# Patient Record
Sex: FEMALE | Race: OTHER | HISPANIC OR LATINO | URBAN - METROPOLITAN AREA
[De-identification: names, ages, dates, MRNs, and addresses within clinical notes are randomized per-mention and may not be internally consistent; named-entity substitution may affect disease eponyms.]

---

## 2017-04-25 ENCOUNTER — EMERGENCY (EMERGENCY)
Facility: HOSPITAL | Age: 32
LOS: 1 days | Discharge: HOME | End: 2017-04-25
Attending: EMERGENCY MEDICINE | Admitting: EMERGENCY MEDICINE
Payer: COMMERCIAL

## 2017-04-25 VITALS
HEART RATE: 89 BPM | TEMPERATURE: 98 F | SYSTOLIC BLOOD PRESSURE: 140 MMHG | OXYGEN SATURATION: 100 % | DIASTOLIC BLOOD PRESSURE: 86 MMHG | RESPIRATION RATE: 16 BRPM

## 2017-04-25 DIAGNOSIS — Z98.89 OTHER SPECIFIED POSTPROCEDURAL STATES: Chronic | ICD-10-CM

## 2017-04-25 DIAGNOSIS — E07.89 OTHER SPECIFIED DISORDERS OF THYROID: Chronic | ICD-10-CM

## 2017-04-25 LAB
ALBUMIN SERPL ELPH-MCNC: 4.5 G/DL — SIGNIFICANT CHANGE UP (ref 3.3–5)
ALP SERPL-CCNC: 73 U/L — SIGNIFICANT CHANGE UP (ref 40–120)
ALT FLD-CCNC: 36 U/L — HIGH (ref 4–33)
APPEARANCE UR: CLEAR — SIGNIFICANT CHANGE UP
AST SERPL-CCNC: 24 U/L — SIGNIFICANT CHANGE UP (ref 4–32)
BASOPHILS # BLD AUTO: 0.03 K/UL — SIGNIFICANT CHANGE UP (ref 0–0.2)
BASOPHILS NFR BLD AUTO: 0.4 % — SIGNIFICANT CHANGE UP (ref 0–2)
BILIRUB SERPL-MCNC: 0.6 MG/DL — SIGNIFICANT CHANGE UP (ref 0.2–1.2)
BILIRUB UR-MCNC: NEGATIVE — SIGNIFICANT CHANGE UP
BLOOD UR QL VISUAL: NEGATIVE — SIGNIFICANT CHANGE UP
BUN SERPL-MCNC: 13 MG/DL — SIGNIFICANT CHANGE UP (ref 7–23)
CALCIUM SERPL-MCNC: 9.3 MG/DL — SIGNIFICANT CHANGE UP (ref 8.4–10.5)
CHLORIDE SERPL-SCNC: 103 MMOL/L — SIGNIFICANT CHANGE UP (ref 98–107)
CO2 SERPL-SCNC: 26 MMOL/L — SIGNIFICANT CHANGE UP (ref 22–31)
COLOR SPEC: SIGNIFICANT CHANGE UP
CREAT SERPL-MCNC: 0.96 MG/DL — SIGNIFICANT CHANGE UP (ref 0.5–1.3)
EOSINOPHIL # BLD AUTO: 0.21 K/UL — SIGNIFICANT CHANGE UP (ref 0–0.5)
EOSINOPHIL NFR BLD AUTO: 2.6 % — SIGNIFICANT CHANGE UP (ref 0–6)
GLUCOSE SERPL-MCNC: 81 MG/DL — SIGNIFICANT CHANGE UP (ref 70–99)
GLUCOSE UR-MCNC: NEGATIVE — SIGNIFICANT CHANGE UP
HCT VFR BLD CALC: 42.3 % — SIGNIFICANT CHANGE UP (ref 34.5–45)
HGB BLD-MCNC: 13.6 G/DL — SIGNIFICANT CHANGE UP (ref 11.5–15.5)
IMM GRANULOCYTES NFR BLD AUTO: 0.2 % — SIGNIFICANT CHANGE UP (ref 0–1.5)
KETONES UR-MCNC: NEGATIVE — SIGNIFICANT CHANGE UP
LEUKOCYTE ESTERASE UR-ACNC: NEGATIVE — SIGNIFICANT CHANGE UP
LYMPHOCYTES # BLD AUTO: 3.34 K/UL — HIGH (ref 1–3.3)
LYMPHOCYTES # BLD AUTO: 41.2 % — SIGNIFICANT CHANGE UP (ref 13–44)
MCHC RBC-ENTMCNC: 31.1 PG — SIGNIFICANT CHANGE UP (ref 27–34)
MCHC RBC-ENTMCNC: 32.2 % — SIGNIFICANT CHANGE UP (ref 32–36)
MCV RBC AUTO: 96.8 FL — SIGNIFICANT CHANGE UP (ref 80–100)
MONOCYTES # BLD AUTO: 0.72 K/UL — SIGNIFICANT CHANGE UP (ref 0–0.9)
MONOCYTES NFR BLD AUTO: 8.9 % — SIGNIFICANT CHANGE UP (ref 2–14)
MUCOUS THREADS # UR AUTO: SIGNIFICANT CHANGE UP
NEUTROPHILS # BLD AUTO: 3.78 K/UL — SIGNIFICANT CHANGE UP (ref 1.8–7.4)
NEUTROPHILS NFR BLD AUTO: 46.7 % — SIGNIFICANT CHANGE UP (ref 43–77)
NITRITE UR-MCNC: NEGATIVE — SIGNIFICANT CHANGE UP
PH UR: 6 — SIGNIFICANT CHANGE UP (ref 4.6–8)
PLATELET # BLD AUTO: 230 K/UL — SIGNIFICANT CHANGE UP (ref 150–400)
PMV BLD: 11.7 FL — SIGNIFICANT CHANGE UP (ref 7–13)
POTASSIUM SERPL-MCNC: 3.7 MMOL/L — SIGNIFICANT CHANGE UP (ref 3.5–5.3)
POTASSIUM SERPL-SCNC: 3.7 MMOL/L — SIGNIFICANT CHANGE UP (ref 3.5–5.3)
PROT SERPL-MCNC: 7.7 G/DL — SIGNIFICANT CHANGE UP (ref 6–8.3)
PROT UR-MCNC: NEGATIVE — SIGNIFICANT CHANGE UP
RBC # BLD: 4.37 M/UL — SIGNIFICANT CHANGE UP (ref 3.8–5.2)
RBC # FLD: 13.5 % — SIGNIFICANT CHANGE UP (ref 10.3–14.5)
RBC CASTS # UR COMP ASSIST: SIGNIFICANT CHANGE UP (ref 0–?)
SODIUM SERPL-SCNC: 142 MMOL/L — SIGNIFICANT CHANGE UP (ref 135–145)
SP GR SPEC: 1.01 — SIGNIFICANT CHANGE UP (ref 1–1.03)
SQUAMOUS # UR AUTO: SIGNIFICANT CHANGE UP
UROBILINOGEN FLD QL: NORMAL E.U. — SIGNIFICANT CHANGE UP (ref 0.1–0.2)
WBC # BLD: 8.1 K/UL — SIGNIFICANT CHANGE UP (ref 3.8–10.5)
WBC # FLD AUTO: 8.1 K/UL — SIGNIFICANT CHANGE UP (ref 3.8–10.5)
WBC UR QL: SIGNIFICANT CHANGE UP (ref 0–?)

## 2017-04-25 PROCEDURE — 99284 EMERGENCY DEPT VISIT MOD MDM: CPT

## 2017-04-25 NOTE — ED ADULT TRIAGE NOTE - CHIEF COMPLAINT QUOTE
c.o left sided abdominal pain since Sunday. denies nausea/vomiting. appears comfortable. pmh thyroid cancer

## 2017-04-25 NOTE — ED PROVIDER NOTE - PROGRESS NOTE DETAILS
Donnie Jimenez: Pt signed out to me by Dr. Bonilla. Plan to follow ct results. ct results showing no signs of LUQ pain. Denies any pelvic pain. Pt feeling better, tolerating PO. Pt stable for discharge and to follow up with pmd.

## 2017-04-25 NOTE — ED PROVIDER NOTE - OBJECTIVE STATEMENT
32F h/o thyroid cancer s/p thyroidectomy presents with left sided abdominal pain. States pain started 2d ago and has been constant since. Pain is in LUQ and does not radiate. No associated n/v/d, no dysuria or hematuria. No prior h/o similar pain although reports that pain feels similar to when she had trouble with her liver during IVF treatment several years ago, but this pain is on the left.  Notes some increased pain with twisting movements. Now LMP last month, was placed on OCPs but developed some nausea so she stopped several weeks ago.  No fever.  No other new medications.

## 2017-04-26 PROCEDURE — 74177 CT ABD & PELVIS W/CONTRAST: CPT | Mod: 26

## 2019-10-09 PROBLEM — E03.9 HYPOTHYROIDISM, UNSPECIFIED: Chronic | Status: ACTIVE | Noted: 2017-04-25

## 2019-10-25 ENCOUNTER — RESULT REVIEW (OUTPATIENT)
Age: 34
End: 2019-10-25

## 2019-10-25 ENCOUNTER — OUTPATIENT (OUTPATIENT)
Dept: OUTPATIENT SERVICES | Facility: HOSPITAL | Age: 34
LOS: 1 days | Discharge: HOME | End: 2019-10-25

## 2019-10-25 DIAGNOSIS — E07.89 OTHER SPECIFIED DISORDERS OF THYROID: Chronic | ICD-10-CM

## 2019-10-25 DIAGNOSIS — Z98.89 OTHER SPECIFIED POSTPROCEDURAL STATES: Chronic | ICD-10-CM

## 2019-10-26 ENCOUNTER — TRANSCRIPTION ENCOUNTER (OUTPATIENT)
Age: 34
End: 2019-10-26

## 2019-10-31 LAB — SURGICAL PATHOLOGY STUDY: SIGNIFICANT CHANGE UP

## 2019-11-15 PROBLEM — Z00.00 ENCOUNTER FOR PREVENTIVE HEALTH EXAMINATION: Status: ACTIVE | Noted: 2019-11-15

## 2020-01-24 NOTE — HISTORY OF PRESENT ILLNESS
[de-identified] : LIAM ALLEN is a 34 year old female who present in the office with morbid obesity (BMI  __  kg/m2)  for bariatric surgery consultation. Patient was referred by  ______

## 2020-01-28 ENCOUNTER — APPOINTMENT (OUTPATIENT)
Dept: SURGERY | Facility: CLINIC | Age: 35
End: 2020-01-28

## 2020-03-06 ENCOUNTER — APPOINTMENT (OUTPATIENT)
Dept: OBGYN | Facility: CLINIC | Age: 35
End: 2020-03-06

## 2020-03-10 ENCOUNTER — APPOINTMENT (OUTPATIENT)
Dept: SURGERY | Facility: CLINIC | Age: 35
End: 2020-03-10

## 2021-04-07 ENCOUNTER — APPOINTMENT (OUTPATIENT)
Dept: SURGERY | Facility: CLINIC | Age: 36
End: 2021-04-07
Payer: COMMERCIAL

## 2021-04-07 VITALS
BODY MASS INDEX: 35.34 KG/M2 | HEART RATE: 86 BPM | DIASTOLIC BLOOD PRESSURE: 79 MMHG | HEIGHT: 64 IN | SYSTOLIC BLOOD PRESSURE: 118 MMHG | WEIGHT: 207 LBS

## 2021-04-07 VITALS — TEMPERATURE: 97.2 F

## 2021-04-07 DIAGNOSIS — Z82.49 FAMILY HISTORY OF ISCHEMIC HEART DISEASE AND OTHER DISEASES OF THE CIRCULATORY SYSTEM: ICD-10-CM

## 2021-04-07 DIAGNOSIS — E07.9 DISORDER OF THYROID, UNSPECIFIED: ICD-10-CM

## 2021-04-07 DIAGNOSIS — Z83.3 FAMILY HISTORY OF DIABETES MELLITUS: ICD-10-CM

## 2021-04-07 DIAGNOSIS — Z78.9 OTHER SPECIFIED HEALTH STATUS: ICD-10-CM

## 2021-04-07 DIAGNOSIS — Z83.49 FAMILY HISTORY OF OTHER ENDOCRINE, NUTRITIONAL AND METABOLIC DISEASES: ICD-10-CM

## 2021-04-07 PROCEDURE — 99203 OFFICE O/P NEW LOW 30 MIN: CPT

## 2021-04-07 PROCEDURE — 99072 ADDL SUPL MATRL&STAF TM PHE: CPT

## 2021-04-07 RX ORDER — METFORMIN HYDROCHLORIDE 625 MG/1
TABLET ORAL
Refills: 0 | Status: ACTIVE | COMMUNITY

## 2021-04-07 RX ORDER — LEVOTHYROXINE SODIUM 137 UG/1
TABLET ORAL
Refills: 0 | Status: ACTIVE | COMMUNITY

## 2021-04-07 NOTE — PLAN
[FreeTextEntry1] : \par I have had a lengthy conversation with the patient and have discussed my impression and treatment plan with the patient.  \par The risks, benefits and alternatives, including laparoscopic gastric bypass, and laparoscopic vertical sleeve gastrectomy, were discussed at length and all of his questions were answered. The patient appears to understand and wishes to proceed.\par \par The patient was given the following instructions:\par \par 1.	Patient needs a complete medical evaluation including echocardiogram, stress test, pulmonary function test and evaluation for sleep apnea.\par \par 2.	Patient needs evaluation by GI including an upper endoscopy.\par \par 3.	Patient needs nutritional and psychological evaluations.\par \par 4.	Patient must attend a preoperative information meeting.\par \par 5.	Patient must undergo a right upper quadrant ultrasound, if there is no history of prior cholecystectomy.\par \par ·	Screening for Obstructive Sleep apnea (performed today).  Patient does meet criteria for polysomnography testing\par ·	Functional Assessment: Completely independent\par ·	Smoking:  Patient does not smoke.  Smoking cessation counseling/resources discussed (if indicated)\par ·	Substance Abuse:  Patient does not report use of illicit substances.  Counseling/resources discussed (if indicated)\par \par \par The weight loss surgery information packet as well as the nutrition education packet have been reviewed and given to the patient, and I provided and reviewed detailed documents addressing these same topics. I have provided literature about the procedures and encouraged the patient to further research the surgeries, and patient feels well informed.   I also provided patient with detailed information regarding the pre-operative requirements with respect to testing, medical, nutritional and psychological evaluations and documentation of same.  \par \par Also discussed was importance of taking supplements and attending regular follow-up.  I reviewed importance of behavioral modification and follow-up in order to optimize outcomes and avoid complications. The patient is aware of the expected length of stay and discharge plan for a sleeve gastrectomy.  I encouraged the patient to maintain a diet and exercise program to promote optimum health for the surgical procedure and post-op course. \par \par The patient clearly understood that surgery would only be scheduled if there are no medical or psychiatric contraindications and that follow up pre-operative office visits are required.  Patient agrees to begin a multi-disciplinary evaluation as discussed and provide required documentation and progress.  I informed patient that once all testing and evaluations (as deemed necessary) are completed, reviewed, and documentation received, this information to justify medical necessity for weight loss surgery will be submitted to insurance for pre-certification.  \par \par Patient will begin the pre-operative process with a visit to PCP, for whom detailed information regarding the necessary evaluations and documentation of obesity-related medical care was given to patient to share with PCP.  \par \par The patient had the opportunity to ask pertinent questions which were answered.  All of patient’s questions and concerns were addressed to patient’s satisfaction, and patient verbalized an understanding of the information discussed.\par \par 3.	Patient needs nutritional and psychological evaluations.\par 4.	Patient must attend a preoperative information meeting.\par 5.	Patient must undergo a right upper quadrant ultrasound, if there is no history of prior cholecystectomy.\par \par The weight loss surgery information packet as well as the nutrition education packet have been reviewed and given to the patient, and I provided and reviewed detailed documents addressing these same topics. I have provided literature about the procedures and encouraged the patient to further research the surgeries, and patient feels well informed. I also provided patient with detailed information regarding the pre-operative requirements with respect to testing, medical, nutritional and psychological evaluations and documentation of same. \par \par Also discussed was importance of taking supplements and attending regular follow-up. I reviewed importance of behavioral modification and follow-up in order to optimize outcomes and avoid complications. The patient is aware of the expected length of stay and discharge plan for a sleeve gastrectomy. I encouraged the patient to maintain a diet and exercise program to promote optimum health for the surgical procedure and post-op course. \par \par The patient clearly understood that surgery would only be scheduled if there are no medical or psychiatric contraindications and that follow up pre-operative office visits are required. Patient agrees to begin a multi-disciplinary evaluation as discussed and provide required documentation and progress. I informed patient that once all testing and evaluations (as deemed necessary) are completed, reviewed, and documentation received, this information to justify medical necessity for weight loss surgery will be submitted to insurance for pre-certification. \par \par Patient will begin the pre-operative process with a visit to PCP, for whom detailed information regarding the necessary evaluations and documentation of obesity-related medical care was given to patient to share with PCP. \par \par The patient had the opportunity to ask pertinent questions which were answered. All of patient’s questions and concerns were addressed to patient’s satisfaction, and patient verbalized an understanding of the information discussed.\par

## 2021-04-07 NOTE — CONSULT LETTER
[Dear  ___] : Dear  [unfilled], [Consult Letter:] : I had the pleasure of evaluating your patient, [unfilled]. [Please see my note below.] : Please see my note below. [Consult Closing:] : Thank you very much for allowing me to participate in the care of this patient.  If you have any questions, please do not hesitate to contact me. [Sincerely,] : Sincerely, [FreeTextEntry3] : Dylan

## 2021-04-07 NOTE — REVIEW OF SYSTEMS
[Recent Change In Weight] : ~T recent weight change [SOB on Exertion] : shortness of breath during exertion [Reflux/Heartburn] : reflux/heartburn [Negative] : Allergic/Immunologic [Patient Intake Form Reviewed] : Patient intake form was reviewed [Fever] : no fever [Chills] : no chills [Night Sweats] : no night sweats [Fatigue] : no fatigue [Vision Problems] : no vision problems [Dysphagia] : no dysphagia [Hoarseness] : no hoarseness [Odynophagia] : no odynophagia [Chest Pain] : no chest pain [Palpitations] : no palpitations [Lower Ext Edema] : no lower extremity edema [Shortness Of Breath] : no shortness of breath [Wheezing] : no wheezing [Cough] : no cough [Abdominal Pain] : no abdominal pain [Vomiting] : no vomiting [Constipation] : no constipation [Dysuria] : no dysuria [Incontinence] : no incontinence [Joint Pain] : no joint pain [Joint Stiffness] : no joint stiffness [Confused] : no confusion [Dizziness] : no dizziness [Difficulty Walking] : no difficulty walking [Easy Bleeding] : no tendency for easy bleeding [Easy Bruising] : no tendency for easy bruising

## 2021-04-07 NOTE — ASSESSMENT
[FreeTextEntry1] : \par Patient with a BMI of 35.5 which places patient in the morbidly obese category.  Patient also suffers from DM2 .  This has directly contributed to patient's  current medical conditions as well as, a decreased quality of life.  Patient has very little chance of successfully losing and maintaining a significant amount of weight with non-surgical management, and would benefit from surgical intervention.  Patient meets the criteria for weight loss surgery as defined in the NIH consensus statement, surgery is medically necessary. \par Given patient’s current BMI and obesity-related comorbidities, I feel the patient is a candidate for and would benefit from weight loss surgery, as all prior attempts by non-surgical means have been futile.\par \par VTE Risk Calculation:\par \par Does patient have PERSONAL history of VTE? N\par Does patient have PERSONAL history of myocardial infarction, cardiac stent, or acute coronary syndrome? N\par \par Does patient have FAMILY history of VTE? N\par Does patient have FAMILY history of myocardial infarction, cardiac stent, or acute coronary syndrome? N\par \par \par Screening for Sleep Apnea:\par Nightly snoring:  Y\par Witnessed apnea:   Y\par Nocturnal gasping:  Y\par Morning dry mouth:  Y\par Feeling unrefreshed on awakening from sleep:  Y\par Excessive daytime sleepiness:  Y\par \par

## 2021-04-07 NOTE — HISTORY OF PRESENT ILLNESS
[de-identified] : Ms. LIAM ALLEN  presents today  for bariatric surgery consultation. she has a long-standing history of severe obesity refractory to multiple prior attempts at weight loss including conservative treatments of diet and exercise.  Ms. LIAM ALLEN has been obese since  her thyroidectomy  due to the thyroid CA  in 2012 and the most weight that she  has been able to lose by any means is negligible.   Previous weight loss efforts include: Calorie-counting, restricted intake. Ms. ALLEN has limited capacity for exercise due to excess weight.  she  feels that weight loss surgery is the only option at this point for effective and clinically meaningful weight loss.  she reports abdominal myomectomy, laparoscopy with RADHA and laparoscopic  cholecystectomy .  she states that she had an EGD in February 2021 that showed H.pylori. she reports being referred to sleep study due to loud snoring. She did not have the test due to the childcare issues.  she  is interested in sleeve gastrectomy.

## 2021-04-07 NOTE — PHYSICAL EXAM
[Obese, well nourished, in no acute distress] : obese, well nourished, in no acute distress [Normal] : affect appropriate [de-identified] : Obese, protuberant. Soft, nontender, nondistended, no rebound or guarding.

## 2021-05-17 ENCOUNTER — APPOINTMENT (OUTPATIENT)
Dept: CARDIOLOGY | Facility: CLINIC | Age: 36
End: 2021-05-17

## 2021-05-29 ENCOUNTER — APPOINTMENT (OUTPATIENT)
Dept: DISASTER EMERGENCY | Facility: CLINIC | Age: 36
End: 2021-05-29

## 2021-05-30 LAB — SARS-COV-2 N GENE NPH QL NAA+PROBE: NOT DETECTED

## 2021-06-01 ENCOUNTER — APPOINTMENT (OUTPATIENT)
Dept: PULMONOLOGY | Facility: CLINIC | Age: 36
End: 2021-06-01
Payer: COMMERCIAL

## 2021-06-01 DIAGNOSIS — R06.83 SNORING: ICD-10-CM

## 2021-06-01 PROCEDURE — 94729 DIFFUSING CAPACITY: CPT

## 2021-06-01 PROCEDURE — ZZZZZ: CPT

## 2021-06-01 PROCEDURE — 94010 BREATHING CAPACITY TEST: CPT

## 2021-06-01 PROCEDURE — 94726 PLETHYSMOGRAPHY LUNG VOLUMES: CPT

## 2021-06-01 PROCEDURE — 99244 OFF/OP CNSLTJ NEW/EST MOD 40: CPT | Mod: 25

## 2021-06-01 PROCEDURE — 99072 ADDL SUPL MATRL&STAF TM PHE: CPT

## 2021-06-01 PROCEDURE — 71046 X-RAY EXAM CHEST 2 VIEWS: CPT

## 2021-06-01 NOTE — CONSULT LETTER
[Dear  ___] : Dear  [unfilled], [Consult Letter:] : I had the pleasure of evaluating your patient, [unfilled]. [Please see my note below.] : Please see my note below. [Consult Closing:] : Thank you very much for allowing me to participate in the care of this patient.  If you have any questions, please do not hesitate to contact me. [Sincerely,] : Sincerely, [FreeTextEntry3] : Sammie Bowles MD, [facility]

## 2021-06-01 NOTE — PHYSICAL EXAM
[No Acute Distress] : no acute distress [Normal Oropharynx] : normal oropharynx [Normal Appearance] : normal appearance [No Neck Mass] : no neck mass [Normal Rate/Rhythm] : normal rate/rhythm [Normal S1, S2] : normal s1, s2 [No Murmurs] : no murmurs [No Resp Distress] : no resp distress [Clear to Auscultation Bilaterally] : clear to auscultation bilaterally [No Abnormalities] : no abnormalities [Benign] : benign [Normal Gait] : normal gait [No Clubbing] : no clubbing [No Edema] : no edema [No Cyanosis] : no cyanosis [FROM] : FROM [Normal Color/ Pigmentation] : normal color/ pigmentation [No Focal Deficits] : no focal deficits [Oriented x3] : oriented x3 [Normal Affect] : normal affect

## 2021-06-01 NOTE — ASSESSMENT
[FreeTextEntry1] : 36F with MO. Clinically adnd radiographically no evidence of pulmonary disease.\par PFTs normal\par CXR clear\par \par Pt reports significant snoring and daytime sleepiness, and should undergo a diagnostic home sleep study to assess for any evidence of LAURY. If found to be moderate or severe, would recommend 2-3 weeks of appropriate CPAP treatment prior to any surgical intervention.

## 2021-06-01 NOTE — HISTORY OF PRESENT ILLNESS
[TextBox_4] : 36F who is here for preop evaluation for bariatric surgery. Pt reports no h/o respiratory illnesses, non smoker, without any occupational exposures. Pt reports good exercise tolerance on flat surface and some difficulty climbing stairs. No cough, no chest pain or palpitations. \par Pt reports significant ,snoring, frequent night awakenings and daytime sleepiness with ESS >10.

## 2021-06-15 ENCOUNTER — APPOINTMENT (OUTPATIENT)
Dept: CARDIOLOGY | Facility: CLINIC | Age: 36
End: 2021-06-15

## 2021-06-21 ENCOUNTER — APPOINTMENT (OUTPATIENT)
Dept: ENDOCRINOLOGY | Facility: CLINIC | Age: 36
End: 2021-06-21
Payer: COMMERCIAL

## 2021-06-21 VITALS — HEIGHT: 64 IN | WEIGHT: 206 LBS | BODY MASS INDEX: 35.17 KG/M2

## 2021-06-21 PROCEDURE — ZZZZZ: CPT

## 2021-07-12 ENCOUNTER — APPOINTMENT (OUTPATIENT)
Dept: CARDIOLOGY | Facility: CLINIC | Age: 36
End: 2021-07-12
Payer: COMMERCIAL

## 2021-07-12 ENCOUNTER — NON-APPOINTMENT (OUTPATIENT)
Age: 36
End: 2021-07-12

## 2021-07-12 VITALS
BODY MASS INDEX: 35.17 KG/M2 | RESPIRATION RATE: 17 BRPM | WEIGHT: 206 LBS | OXYGEN SATURATION: 98 % | TEMPERATURE: 98.4 F | HEIGHT: 64 IN | HEART RATE: 88 BPM | DIASTOLIC BLOOD PRESSURE: 65 MMHG | SYSTOLIC BLOOD PRESSURE: 115 MMHG

## 2021-07-12 DIAGNOSIS — R07.89 OTHER CHEST PAIN: ICD-10-CM

## 2021-07-12 DIAGNOSIS — E11.9 TYPE 2 DIABETES MELLITUS W/OUT COMPLICATIONS: ICD-10-CM

## 2021-07-12 DIAGNOSIS — R01.1 CARDIAC MURMUR, UNSPECIFIED: ICD-10-CM

## 2021-07-12 DIAGNOSIS — Z13.6 ENCOUNTER FOR SCREENING FOR CARDIOVASCULAR DISORDERS: ICD-10-CM

## 2021-07-12 PROCEDURE — 99205 OFFICE O/P NEW HI 60 MIN: CPT

## 2021-07-12 PROCEDURE — 93000 ELECTROCARDIOGRAM COMPLETE: CPT

## 2021-07-12 PROCEDURE — 99072 ADDL SUPL MATRL&STAF TM PHE: CPT

## 2021-07-14 ENCOUNTER — APPOINTMENT (OUTPATIENT)
Dept: SURGERY | Facility: CLINIC | Age: 36
End: 2021-07-14
Payer: COMMERCIAL

## 2021-07-14 VITALS
HEIGHT: 64 IN | DIASTOLIC BLOOD PRESSURE: 82 MMHG | SYSTOLIC BLOOD PRESSURE: 121 MMHG | WEIGHT: 210 LBS | HEART RATE: 76 BPM | OXYGEN SATURATION: 99 % | BODY MASS INDEX: 35.85 KG/M2

## 2021-07-14 VITALS — TEMPERATURE: 97.5 F

## 2021-07-14 LAB
ALBUMIN SERPL ELPH-MCNC: 4.3 G/DL
ALP BLD-CCNC: 84 U/L
ALT SERPL-CCNC: 52 U/L
ANION GAP SERPL CALC-SCNC: 9 MMOL/L
AST SERPL-CCNC: 24 U/L
BASOPHILS # BLD AUTO: 0.04 K/UL
BASOPHILS NFR BLD AUTO: 0.9 %
BILIRUB SERPL-MCNC: 0.9 MG/DL
BUN SERPL-MCNC: 10 MG/DL
CALCIUM SERPL-MCNC: 9.6 MG/DL
CALCIUM SERPL-MCNC: 9.6 MG/DL
CHLORIDE SERPL-SCNC: 104 MMOL/L
CHOLEST SERPL-MCNC: 141 MG/DL
CO2 SERPL-SCNC: 27 MMOL/L
CREAT SERPL-MCNC: 0.62 MG/DL
EOSINOPHIL # BLD AUTO: 0.18 K/UL
EOSINOPHIL NFR BLD AUTO: 3.8 %
ESTIMATED AVERAGE GLUCOSE: 111 MG/DL
FOLATE SERPL-MCNC: 11.3 NG/ML
GLUCOSE SERPL-MCNC: 94 MG/DL
HBA1C MFR BLD HPLC: 5.5 %
HCT VFR BLD CALC: 41.2 %
HDLC SERPL-MCNC: 38 MG/DL
HGB BLD-MCNC: 13.1 G/DL
IMM GRANULOCYTES NFR BLD AUTO: 0.4 %
IRON SERPL-MCNC: 110 UG/DL
LDLC SERPL CALC-MCNC: 85 MG/DL
LYMPHOCYTES # BLD AUTO: 2.33 K/UL
LYMPHOCYTES NFR BLD AUTO: 49.7 %
MAGNESIUM SERPL-MCNC: 1.6 MG/DL
MAN DIFF?: NORMAL
MCHC RBC-ENTMCNC: 30.6 PG
MCHC RBC-ENTMCNC: 31.8 GM/DL
MCV RBC AUTO: 96.3 FL
MONOCYTES # BLD AUTO: 0.53 K/UL
MONOCYTES NFR BLD AUTO: 11.3 %
NEUTROPHILS # BLD AUTO: 1.59 K/UL
NEUTROPHILS NFR BLD AUTO: 33.9 %
NONHDLC SERPL-MCNC: 102 MG/DL
PARATHYROID HORMONE INTACT: 43 PG/ML
PLATELET # BLD AUTO: 243 K/UL
POTASSIUM SERPL-SCNC: 4.6 MMOL/L
PROT SERPL-MCNC: 6.7 G/DL
RBC # BLD: 4.28 M/UL
RBC # FLD: 13 %
SODIUM SERPL-SCNC: 139 MMOL/L
TRIGL SERPL-MCNC: 84 MG/DL
TSH SERPL-ACNC: 0.58 UIU/ML
VIT B12 SERPL-MCNC: 469 PG/ML
WBC # FLD AUTO: 4.69 K/UL

## 2021-07-14 PROCEDURE — 99072 ADDL SUPL MATRL&STAF TM PHE: CPT

## 2021-07-14 PROCEDURE — 99213 OFFICE O/P EST LOW 20 MIN: CPT

## 2021-07-14 RX ORDER — LEVOTHYROXINE SODIUM 0.15 MG/1
150 TABLET ORAL
Qty: 30 | Refills: 0 | Status: ACTIVE | COMMUNITY
Start: 2020-11-08

## 2021-07-14 RX ORDER — EPINEPHRINE 0.3 MG/.3ML
0.3 INJECTION INTRAMUSCULAR
Qty: 2 | Refills: 0 | Status: ACTIVE | COMMUNITY
Start: 2021-07-02

## 2021-07-14 RX ORDER — OMEPRAZOLE MAGNESIUM, AMOXICILLIN AND RIFABUTIN 10; 250; 12.5 MG/1; MG/1; MG/1
250-12.5-1 CAPSULE, DELAYED RELEASE ORAL
Qty: 168 | Refills: 0 | Status: ACTIVE | COMMUNITY
Start: 2021-03-19

## 2021-07-14 RX ORDER — FLUTICASONE PROPIONATE 50 UG/1
50 SPRAY, METERED NASAL
Qty: 16 | Refills: 0 | Status: ACTIVE | COMMUNITY
Start: 2021-07-02

## 2021-07-14 NOTE — HISTORY OF PRESENT ILLNESS
[de-identified] : Ms. LIAM ALLEN  is here for monthly pre-operative follow-up and weight management program in preparation for bariatric surgery. she  is making good food choices, working on eating smaller portions and becoming more mindful. Ms. ALLEN  has made a concerted effort to eat more balanced and nutritionally sound meals, and to engage in some level of physical activity on a regular basis. she  continues to struggle with weight loss despite continuous concerted efforts since the prior visit.\par \par Patient has completed the following evaluations: Cardiology- needs to schedule Echo and stress test, Pulmonary- PFT was normal, needs to  the equipment for Sleep Study at home , Psych.- cleared  GI cleared.  Patient has also seen our bariatric program coordinator and nutritionist. She has attended our bariatric information session.   \par   [de-identified] : Patient reports no interval changes to medications, medical history or overall health status.\par

## 2021-07-14 NOTE — REVIEW OF SYSTEMS
[Recent Change In Weight] : ~T recent weight change [Negative] : Allergic/Immunologic [Patient Intake Form Reviewed] : Patient intake form was reviewed [SOB on Exertion] : shortness of breath during exertion [Reflux/Heartburn] : reflux/heartburn [Fever] : no fever [Chills] : no chills [Night Sweats] : no night sweats [Fatigue] : no fatigue [Vision Problems] : no vision problems [Dysphagia] : no dysphagia [Hoarseness] : no hoarseness [Odynophagia] : no odynophagia [Chest Pain] : no chest pain [Palpitations] : no palpitations [Lower Ext Edema] : no lower extremity edema [Shortness Of Breath] : no shortness of breath [Wheezing] : no wheezing [Cough] : no cough [Abdominal Pain] : no abdominal pain [Vomiting] : no vomiting [Constipation] : no constipation [Dysuria] : no dysuria [Incontinence] : no incontinence [Joint Pain] : no joint pain [Joint Stiffness] : no joint stiffness [Confused] : no confusion [Dizziness] : no dizziness [Difficulty Walking] : no difficulty walking [Easy Bleeding] : no tendency for easy bleeding [Easy Bruising] : no tendency for easy bruising

## 2021-07-14 NOTE — PLAN
[FreeTextEntry1] :  Patient will obtain any outstanding evaluations in short order. Once again the pre-op requirements/evaluations and any available results were reviewed. \par \par We will collect and review any available/additional results and records of the multi-disciplinary evaluation. I encouraged patient to bring copies of all completed testing/evaluations to the next office visit with me. \par \par I encouraged the patient to continue a diet and exercise program to promote optimum health for the surgical procedure and post-op course.\par \par Patient voiced understanding of these behavioral recommendations and agrees to practice them with the understanding that success with these behaviors will be assessed at future visits. \par \par Patient’s questions and concerns addressed to patient's satisfaction and patient verbalized an understanding of the information discussed.\par  \par She will return to see me in early September.

## 2021-07-14 NOTE — CONSULT LETTER
[Dear  ___] : Dear  [unfilled], [Courtesy Letter:] : I had the pleasure of seeing your patient, [unfilled], in my office today. [Please see my note below.] : Please see my note below. [Consult Closing:] : Thank you very much for allowing me to participate in the care of this patient.  If you have any questions, please do not hesitate to contact me. [Sincerely,] : Sincerely, [FreeTextEntry3] : Dylan

## 2021-07-14 NOTE — PHYSICAL EXAM
[Obese, well nourished, in no acute distress] : obese, well nourished, in no acute distress [Normal] : affect appropriate [de-identified] : Obese, protuberant. Soft, nontender, nondistended, no rebound or guarding.

## 2021-07-14 NOTE — ASSESSMENT
[FreeTextEntry1] : Patient remains an excellent candidate for weight loss surgery, given the presence/risk of developing or worsening of multiple obesity-related comorbidities, and remains committed to proceeding with weight loss surgery.\par \par Patient is in the process of obtaining the required pre-operative evaluations, which for this patient include:\par Nutrition\par  \par  Cardiology\par Pulmonary \par Labs

## 2021-07-15 LAB — 24R-OH-CALCIDIOL SERPL-MCNC: 59.2 PG/ML

## 2021-07-22 ENCOUNTER — OUTPATIENT (OUTPATIENT)
Dept: OUTPATIENT SERVICES | Facility: HOSPITAL | Age: 36
LOS: 1 days | End: 2021-07-22
Payer: COMMERCIAL

## 2021-07-22 DIAGNOSIS — R01.1 CARDIAC MURMUR, UNSPECIFIED: ICD-10-CM

## 2021-07-22 DIAGNOSIS — Z98.89 OTHER SPECIFIED POSTPROCEDURAL STATES: Chronic | ICD-10-CM

## 2021-07-22 DIAGNOSIS — E07.89 OTHER SPECIFIED DISORDERS OF THYROID: Chronic | ICD-10-CM

## 2021-07-22 PROCEDURE — 93017 CV STRESS TEST TRACING ONLY: CPT

## 2021-07-22 PROCEDURE — 93018 CV STRESS TEST I&R ONLY: CPT

## 2021-07-22 PROCEDURE — 93016 CV STRESS TEST SUPVJ ONLY: CPT

## 2021-07-22 PROCEDURE — 93306 TTE W/DOPPLER COMPLETE: CPT | Mod: 26

## 2021-07-22 PROCEDURE — 93306 TTE W/DOPPLER COMPLETE: CPT

## 2021-07-29 ENCOUNTER — FORM ENCOUNTER (OUTPATIENT)
Age: 36
End: 2021-07-29

## 2021-07-30 ENCOUNTER — APPOINTMENT (OUTPATIENT)
Dept: SLEEP CENTER | Facility: CLINIC | Age: 36
End: 2021-07-30

## 2021-08-12 ENCOUNTER — APPOINTMENT (OUTPATIENT)
Dept: PULMONOLOGY | Facility: CLINIC | Age: 36
End: 2021-08-12
Payer: COMMERCIAL

## 2021-08-12 VITALS
BODY MASS INDEX: 35.85 KG/M2 | OXYGEN SATURATION: 98 % | WEIGHT: 210 LBS | SYSTOLIC BLOOD PRESSURE: 134 MMHG | DIASTOLIC BLOOD PRESSURE: 82 MMHG | HEART RATE: 67 BPM | HEIGHT: 64 IN | TEMPERATURE: 98 F | RESPIRATION RATE: 16 BRPM

## 2021-08-12 DIAGNOSIS — G47.33 OBSTRUCTIVE SLEEP APNEA (ADULT) (PEDIATRIC): ICD-10-CM

## 2021-08-12 DIAGNOSIS — Z01.811 ENCOUNTER FOR PREPROCEDURAL RESPIRATORY EXAMINATION: ICD-10-CM

## 2021-08-12 PROCEDURE — 99214 OFFICE O/P EST MOD 30 MIN: CPT

## 2021-08-12 NOTE — ASSESSMENT
[FreeTextEntry1] : 36F with MO. Clinically adnd radiographically no evidence of pulmonary disease.\par PFTs normal\par CXR clear\par \par Pt has moderate LAURY with frequent desaturations as low as 79%. She will need to be treated with nightly CPAP to be optimized for surgery for at least 2-3 weeks.\par Equipment ordered. \par f/u 4-6 weeks after initiation of CPAP use.\par

## 2021-08-12 NOTE — CONSULT LETTER
[Dear  ___] : Dear  [unfilled], [Consult Letter:] : I had the pleasure of evaluating your patient, [unfilled]. [Please see my note below.] : Please see my note below. [Consult Closing:] : Thank you very much for allowing me to participate in the care of this patient.  If you have any questions, please do not hesitate to contact me. [Sincerely,] : Sincerely, [FreeTextEntry3] : Sammie Bowles MD, [facility]

## 2021-09-22 ENCOUNTER — APPOINTMENT (OUTPATIENT)
Dept: SURGERY | Facility: CLINIC | Age: 36
End: 2021-09-22
Payer: COMMERCIAL

## 2021-09-22 ENCOUNTER — APPOINTMENT (OUTPATIENT)
Dept: BARIATRICS | Facility: CLINIC | Age: 36
End: 2021-09-22

## 2021-09-22 VITALS
HEART RATE: 80 BPM | BODY MASS INDEX: 35.85 KG/M2 | DIASTOLIC BLOOD PRESSURE: 81 MMHG | WEIGHT: 210 LBS | OXYGEN SATURATION: 99 % | HEIGHT: 64 IN | SYSTOLIC BLOOD PRESSURE: 122 MMHG

## 2021-09-22 VITALS — TEMPERATURE: 97.2 F

## 2021-09-22 PROCEDURE — 99214 OFFICE O/P EST MOD 30 MIN: CPT

## 2021-09-22 NOTE — ASSESSMENT
[FreeTextEntry1] : Patient remains an excellent candidate for weight loss surgery, given the  presence/risk of developing or worsening of  multiple obesity-related comorbidities, and remains committed to proceeding with weight loss surgery. \par \par Patient is in the process of obtaining  the required pre-operative evaluations. \par \par She will be seeing her PCP on October 19th for medical clearance. \par \par \par \par \par

## 2021-09-22 NOTE — DATA REVIEWED
[FreeTextEntry1] :  Cardiology- needs to schedule Echo and stress test, \par Pulmonary- PFT was normal, needs to receive her CPAP device \par Psych.- cleared  (Dr. Monzon)\par GI cleared. \par  Patient has also seen our bariatric program coordinator and nutritionist. \par She has attended our bariatric information session.

## 2021-09-22 NOTE — PLAN
[FreeTextEntry1] : Patient will obtain any outstanding evaluations in short order. Once again the pre-op requirements/evaluations and any available results were reviewed. \par \par We will collect and review any available/additional results and records of the multi-disciplinary evaluation.  I encouraged patient to bring copies of all completed testing/evaluations to the next office visit with me. \par \par I encouraged the patient to continue a diet and exercise program to promote optimum health for the surgical procedure and post-op course.\par \par Patient  voiced understanding of these behavioral recommendations and agrees to practice them with the understanding that success with these behaviors will be assessed at future visits. \par \par Patient’s questions and concerns addressed to patient's satisfaction and patient verbalized an understanding of the information discussed.\par \par She will return to see me on October 20.

## 2021-09-22 NOTE — PHYSICAL EXAM
[Obese, well nourished, in no acute distress] : obese, well nourished, in no acute distress [Normal] : affect appropriate [de-identified] : Obese, protuberant. Soft, nontender, nondistended, no rebound or guarding.

## 2021-09-22 NOTE — HISTORY OF PRESENT ILLNESS
[de-identified] : Patient is here for monthly pre-operative follow-up and  weight management program in preparation for bariatric surgery.  Patient is making good food choices, working on eating smaller portions and becoming more mindful. \par Patient has made a concerted effort to eat more balanced and nutritionally sound meals, and to engage in some level of physical activity on a regular basis. \par Patient continues to struggle with weight loss despite continuous concerted efforts since the prior visit.  \par Patient remains interested in a sleeve gastrectomy.    \par Patient has completed the following evaluations: GI/EGD .    \par Patient had initial evaluation with pulmonary, but is awaiting her CPAP device.   [de-identified] : Patient reports no interval changes to medications, medical history or overall health status.\par

## 2021-09-22 NOTE — REVIEW OF SYSTEMS
[Patient Intake Form Reviewed] : Patient intake form was reviewed [Recent Change In Weight] : ~T recent weight change [SOB on Exertion] : shortness of breath during exertion [Reflux/Heartburn] : reflux/heartburn [Negative] : Allergic/Immunologic [Fever] : no fever [Chills] : no chills [Night Sweats] : no night sweats [Fatigue] : no fatigue [Vision Problems] : no vision problems [Dysphagia] : no dysphagia [Hoarseness] : no hoarseness [Chest Pain] : no chest pain [Odynophagia] : no odynophagia [Palpitations] : no palpitations [Lower Ext Edema] : no lower extremity edema [Wheezing] : no wheezing [Shortness Of Breath] : no shortness of breath [Cough] : no cough [Abdominal Pain] : no abdominal pain [Vomiting] : no vomiting [Constipation] : no constipation [Dysuria] : no dysuria [Incontinence] : no incontinence [Joint Pain] : no joint pain [Joint Stiffness] : no joint stiffness [Dizziness] : no dizziness [Confused] : no confusion [Difficulty Walking] : no difficulty walking [Easy Bleeding] : no tendency for easy bleeding [Easy Bruising] : no tendency for easy bruising

## 2021-10-20 ENCOUNTER — APPOINTMENT (OUTPATIENT)
Dept: SURGERY | Facility: CLINIC | Age: 36
End: 2021-10-20
Payer: COMMERCIAL

## 2021-10-20 VITALS
OXYGEN SATURATION: 99 % | HEIGHT: 64 IN | BODY MASS INDEX: 35.85 KG/M2 | HEART RATE: 97 BPM | DIASTOLIC BLOOD PRESSURE: 84 MMHG | SYSTOLIC BLOOD PRESSURE: 135 MMHG | TEMPERATURE: 97.1 F | WEIGHT: 210 LBS

## 2021-10-20 PROCEDURE — 99213 OFFICE O/P EST LOW 20 MIN: CPT

## 2021-10-20 NOTE — ASSESSMENT
[FreeTextEntry1] : Patient remains an excellent candidate for weight loss surgery, given the presence/risk of developing or worsening of multiple obesity-related comorbidities, and remains committed to proceeding with weight loss surgery.\par \par Patient is in the process of obtaining the required pre-operative evaluations, which for this patient include:\par  \par Pulmonary - 2 weeks CPAP titration

## 2021-10-20 NOTE — HISTORY OF PRESENT ILLNESS
[de-identified] : Patient is here for monthly pre-operative follow-up in preparation for bariatric surgery. Patient is making good food choices, working on eating smaller portions and becoming more mindful. Patient has made a concerted effort to eat more balanced and nutritionally sound meals, and to engage in some level of physical activity on a regular basis. Patient continues to struggle with weight loss despite continuous concerted efforts since the prior visit. Patient presents for review and discussion of informed consent in preparation for planned sleeve gastrectomy. Patient attended our weight loss seminar, and has completed all of the required pre-operative testing and evaluations, and is ready to proceed with a sleeve gastrectomy.   Patient had initial evaluation with pulmonary, but is awaiting her CPAP device.         [de-identified] : Patient reports no interval changes to medications, medical history or overall health status.\par

## 2021-10-20 NOTE — PHYSICAL EXAM
[Obese, well nourished, in no acute distress] : obese, well nourished, in no acute distress [Normal] : affect appropriate [de-identified] : Obese, protuberant. Soft, nontender, nondistended, no rebound or guarding.

## 2021-10-20 NOTE — PLAN
[FreeTextEntry1] : Patient will obtain any outstanding evaluations in short order. Once again the pre-op requirements/evaluations and any available results were reviewed. \par \par We will collect and review any available/additional results and records of the multi-disciplinary evaluation.  I encouraged patient to bring copies of all completed testing/evaluations to the next office visit with me. \par \par I encouraged the patient to continue a diet and exercise program to promote optimum health for the surgical procedure and post-op course.\par \par Patient  voiced understanding of these behavioral recommendations and agrees to practice them with the understanding that success with these behaviors will be assessed at future visits. \par \par We will review all available pre-operative evaluations and documentation, and submit to insurance provider for pre-authorization.  \par \par We will plan to proceed with surgery, pending any outstanding medical clearances and insurance authorization. \par \par Informed consent for weight loss surgery procedure was given to patient, and will be reviewed at the pre-operative visit.  \par \par Patient’s questions and concerns addressed to patient's satisfaction and patient verbalized an understanding of the information discussed.\par \par  she will return to the office for discussion of the consent after she receives her CPAP machine for surgical scheduling and final pre-operative visit.

## 2021-11-10 ENCOUNTER — APPOINTMENT (OUTPATIENT)
Dept: SURGERY | Facility: CLINIC | Age: 36
End: 2021-11-10
Payer: COMMERCIAL

## 2021-11-10 VITALS
OXYGEN SATURATION: 99 % | SYSTOLIC BLOOD PRESSURE: 141 MMHG | HEART RATE: 84 BPM | HEIGHT: 64 IN | WEIGHT: 211 LBS | TEMPERATURE: 97.6 F | DIASTOLIC BLOOD PRESSURE: 81 MMHG | BODY MASS INDEX: 36.02 KG/M2

## 2021-11-10 PROCEDURE — 99214 OFFICE O/P EST MOD 30 MIN: CPT

## 2021-11-10 NOTE — PHYSICAL EXAM
[Obese, well nourished, in no acute distress] : obese, well nourished, in no acute distress [Normal] : affect appropriate [de-identified] : Obese, protuberant. Soft, nontender, nondistended, no rebound or guarding.

## 2021-11-10 NOTE — ASSESSMENT
[FreeTextEntry1] :  \par Patient is a 36 year  old female with a BMI of 36.22 which places patient in the morbidly obese category. This has directly contributed to patient's current medical conditions as well as, a decreased quality of life. Patient has very little chance of successfully losing and maintaining a significant amount of weight with non-surgical management, and would benefit from surgical intervention. Patient meets the criteria for weight loss surgery as defined in the NIH consensus statement, surgery is medically necessary.  Given patient’s current BMI and obesity-related comorbidities, I feel the patient is a candidate for and would benefit from weight loss surgery, as all prior attempts by non-surgical means have been futile. She  has completed all of the required pre-operative testing and evaluations, and is ready to proceed with a sleeve gastrectomy

## 2021-11-10 NOTE — HISTORY OF PRESENT ILLNESS
[de-identified] : Patient is here for monthly pre-operative follow-up in preparation for bariatric surgery. Patient is making good food choices, working on eating smaller portions and becoming more mindful. Patient has made a concerted effort to eat more balanced and nutritionally sound meals, and to engage in some level of physical activity on a regular basis. Patient continues to struggle with weight loss despite continuous concerted efforts since the prior visit. Patient presents for review and discussion of informed consent in preparation for planned sleeve gastrectomy. Patient attended our weight loss seminar, and has completed all of the required pre-operative testing and evaluations, and is ready to proceed with a sleeve gastrectomy. she has received her CPAP machine and has been consistent in using it. Patient  patient had blood work done in October that showed low TSH level. she was advised by her PMD to see her endocrinologist prior to surgery.  [de-identified] : Patient reports no interval changes to medications, medical history or overall health status.\par

## 2021-11-10 NOTE — PLAN
[FreeTextEntry1] : I have had a lengthy and detailed conversation with the patient and have discussed my impressions and treatment plans with the patient. \par Informed consent and potential risks , benefits  and alternatives to the planned surgery were discussed in depth, including but not limited to:  bleeding, infection; seroma, hematoma, abscess; intestinal, gastric, vascular or other visceral or solid organ injury; leak;  injury to other adjacent or surrounding structures or organs; new or worsening reflux/heartburn; inability to complete the intended procedure or need to abort the procedure; port site or incisional hernia; need for reoperation or other procedure; perioperative myocardial infarction, stroke, deep vein thrombosis, pulmonary embolus, pneumonia and death.  All surgical options were discussed including non-surgical treatments. The patient remains interested in a sleeve gastrectomy for weight loss. \par \par The weight loss surgery education packet as well as the nutrition education packet have been reviewed and given to the patient previously, and I also provided patient with detailed information regarding the post-operative instructions and expectations.  \par Also discussed was importance of taking supplements and attending regular follow-up. I reviewed importance of behavioral modification and follow-up in order to optimize outcomes and avoid complications. \par The patient is aware of the expected length of stay and discharge plan for the sleeve gastrectomy procedure. I encouraged the patient to maintain a diet and exercise program to promote optimum health for the surgical procedure and post-op course. \par I informed patient that all testing and evaluations and documentation have been received and reviewed.  This information to justify medical necessity for weight loss surgery will be submitted to insurance for pre-certification. \par \par We will plan to proceed with surgery at the next available date, pending any required insurance pre-certification or pre-approval.  \par Patient agrees to obtain any outstanding necessary pre-operative evaluations and clearances that may be required for pre-surgical optimization.  \par \par The patient had the opportunity to ask pertinent questions, and all of patient’s questions and concerns were addressed to patient’s satisfaction.  Patient verbalized an understanding of the information discussed, and wishes to proceed with surgery. Informed consent for sleeve gastrectomy reviewed with and signed by patient.\par \par

## 2021-12-03 DIAGNOSIS — Z01.818 ENCOUNTER FOR OTHER PREPROCEDURAL EXAMINATION: ICD-10-CM

## 2021-12-10 ENCOUNTER — APPOINTMENT (OUTPATIENT)
Dept: DISASTER EMERGENCY | Facility: CLINIC | Age: 36
End: 2021-12-10

## 2021-12-30 ENCOUNTER — OUTPATIENT (OUTPATIENT)
Dept: OUTPATIENT SERVICES | Facility: HOSPITAL | Age: 36
LOS: 1 days | End: 2021-12-30
Payer: COMMERCIAL

## 2021-12-30 VITALS
HEART RATE: 74 BPM | TEMPERATURE: 99 F | RESPIRATION RATE: 17 BRPM | DIASTOLIC BLOOD PRESSURE: 73 MMHG | SYSTOLIC BLOOD PRESSURE: 125 MMHG | OXYGEN SATURATION: 100 % | HEIGHT: 64 IN | WEIGHT: 214.95 LBS

## 2021-12-30 DIAGNOSIS — E03.9 HYPOTHYROIDISM, UNSPECIFIED: ICD-10-CM

## 2021-12-30 DIAGNOSIS — Z01.818 ENCOUNTER FOR OTHER PREPROCEDURAL EXAMINATION: ICD-10-CM

## 2021-12-30 DIAGNOSIS — Z98.89 OTHER SPECIFIED POSTPROCEDURAL STATES: Chronic | ICD-10-CM

## 2021-12-30 DIAGNOSIS — E66.01 MORBID (SEVERE) OBESITY DUE TO EXCESS CALORIES: ICD-10-CM

## 2021-12-30 DIAGNOSIS — Z29.9 ENCOUNTER FOR PROPHYLACTIC MEASURES, UNSPECIFIED: ICD-10-CM

## 2021-12-30 DIAGNOSIS — E07.89 OTHER SPECIFIED DISORDERS OF THYROID: Chronic | ICD-10-CM

## 2021-12-30 DIAGNOSIS — G47.33 OBSTRUCTIVE SLEEP APNEA (ADULT) (PEDIATRIC): ICD-10-CM

## 2021-12-30 DIAGNOSIS — Z90.49 ACQUIRED ABSENCE OF OTHER SPECIFIED PARTS OF DIGESTIVE TRACT: Chronic | ICD-10-CM

## 2021-12-30 LAB
BLD GP AB SCN SERPL QL: SIGNIFICANT CHANGE UP
TSH SERPL-MCNC: 3.24 UU/ML — SIGNIFICANT CHANGE UP (ref 0.34–4.82)

## 2021-12-30 PROCEDURE — G0463: CPT

## 2021-12-30 NOTE — H&P PST ADULT - NSICDXPASTSURGICALHX_GEN_ALL_CORE_FT
PAST SURGICAL HISTORY:  H/O myomectomy 2013    H/O total thyroidectomy 2012    History of laparoscopic cholecystectomy 2019    History of tonsillectomy 2003

## 2021-12-30 NOTE — H&P PST ADULT - RS GEN PE MLT RESP DETAILS PC
normal/airway patent/breath sounds equal/good air movement/respirations non-labored/clear to auscultation bilaterally/no intercostal retractions/no wheezes

## 2021-12-30 NOTE — H&P PST ADULT - ATTENDING COMMENTS
I have reviewed the history and physical – recorded 1 to 30 days prior the procedure – and I have re-examined the patient (so as to update any changes in the patient's health status), and I state that it is still appropriate with my corrections and/or amendments as documented.     Patient reports no interval changes to overall health status or medical history since our previous encounter.      I have had a lengthy conversation with the patient and have discussed my impressions and treatment plans with the patient.  Detailed informed consent and potential risks , benefits and alternatives to the planned surgery were once again reinforced and reviewed, including but not limited to:  bleeding, infection, esophageal/gastric/intestinal or other visceral or solid organ injury; leak, sepsis, death, reflux, inability to perform the intended procedure, inability to lose desired amount of weight, regain of weight; port site or incisional hernia, need for other procedure or re-operation, perioperative myocardial infarction, stroke, deep vein thrombosis, pulmonary embolus, pneumonia and death.  All surgical options were discussed including non-surgical treatments. The patient remains interested in a robotic-assisted laparoscopic sleeve gastrectomy for weight loss.     The weight loss surgery education packet as well as the nutrition education packet have been reviewed and given to the patient previously, and I also provided patient with detailed information regarding the post-operative instructions and expectations.  The patient is aware of the expected immediate post operative course, length of stay and discharge plan for the sleeve gastrectomy procedure.   The patient had the opportunity to ask pertinent questions, and all of patient’s questions and concerns were addressed to patient’s satisfaction.  Patient verbalized an understanding of the information discussed, and wishes to proceed with surgery. Informed consent signed.

## 2021-12-30 NOTE — H&P PST ADULT - PRO INTERPRETER NEED 2
General Sunscreen Counseling: Chronic condition. I recommended 30 SPF or higher in daily facial moisturizer. We discussed use of SPF 30 or higher sunscreen, and reapplying this every 3 hours when in the sun. We discussed the use of sun protective clothing and broad-brimmed hats. Detail Level: Zone Products Recommended: Daily facial moisturizer - Cetaphil oil control moisturizer with SPF 30 English

## 2021-12-30 NOTE — H&P PST ADULT - NSICDXPASTMEDICALHX_GEN_ALL_CORE_FT
PAST MEDICAL HISTORY:  Anemia     Fatty liver     Hypothyroid     Hypothyroidism, unspecified type     Morbid (severe) obesity due to excess calories     LAURY on CPAP     Thyroid cancer 2012

## 2021-12-30 NOTE — H&P PST ADULT - FALL HARM RISK - UNIVERSAL INTERVENTIONS
Bed in lowest position, wheels locked, appropriate side rails in place/Call bell, personal items and telephone in reach/Instruct patient to call for assistance before getting out of bed or chair/Non-slip footwear when patient is out of bed/[place] to call system/Physically safe environment - no spills, clutter or unnecessary equipment/Purposeful Proactive Rounding/Room/bathroom lighting operational, light cord in reach

## 2021-12-30 NOTE — H&P PST ADULT - NECK DETAILS
Wound Evaluated By: Tony
Add 81805 Cpt? (Important Note: In 2017 The Use Of 31482 Is Being Tracked By Cms To Determine Future Global Period Reimbursement For Global Periods): no
Detail Level: Detailed
Body Location Override (Optional - Billing Will Still Be Based On Selected Body Map Location If Applicable): Left medial eyebrow
scar thyroidectomy/normal/supple

## 2021-12-30 NOTE — H&P PST ADULT - HISTORY OF PRESENT ILLNESS
36 yr old female Morbidly obese with history of thyroid cancer (h/o thyroidectomy no other adjunct therapy), fatty liver, LAURY moderate on CPAP machine pt had under gone the requirements for bariatric surgery. Pt is schedule for robotic assisted laparoscopic sleeve gastrectomy 1/10/2022. Pt instructed to bring her CPAP machine the day of surgery. Pt instructed on the chlorhexidene on the day of surgery.

## 2021-12-30 NOTE — H&P PST ADULT - PROBLEM SELECTOR PLAN 2
Stop Bang Score confirmed moderate LAURY pt uses CPAP machine. Pt informed to bring in machine day of surgery. Pt will be monitor for patent airway during hospital stay.

## 2022-01-08 LAB — SARS-COV-2 N GENE NPH QL NAA+PROBE: NOT DETECTED

## 2022-01-09 ENCOUNTER — TRANSCRIPTION ENCOUNTER (OUTPATIENT)
Age: 37
End: 2022-01-09

## 2022-01-10 ENCOUNTER — TRANSCRIPTION ENCOUNTER (OUTPATIENT)
Age: 37
End: 2022-01-10

## 2022-01-10 ENCOUNTER — RESULT REVIEW (OUTPATIENT)
Age: 37
End: 2022-01-10

## 2022-01-10 ENCOUNTER — APPOINTMENT (OUTPATIENT)
Dept: SURGERY | Facility: HOSPITAL | Age: 37
End: 2022-01-10

## 2022-01-10 ENCOUNTER — INPATIENT (INPATIENT)
Facility: HOSPITAL | Age: 37
LOS: 0 days | Discharge: HOME | DRG: 621 | End: 2022-01-11
Attending: SURGERY | Admitting: SURGERY
Payer: COMMERCIAL

## 2022-01-10 VITALS
RESPIRATION RATE: 16 BRPM | HEART RATE: 83 BPM | TEMPERATURE: 98 F | OXYGEN SATURATION: 99 % | SYSTOLIC BLOOD PRESSURE: 132 MMHG | DIASTOLIC BLOOD PRESSURE: 73 MMHG | WEIGHT: 214.95 LBS | HEIGHT: 64 IN

## 2022-01-10 DIAGNOSIS — E66.01 MORBID (SEVERE) OBESITY DUE TO EXCESS CALORIES: ICD-10-CM

## 2022-01-10 DIAGNOSIS — E07.89 OTHER SPECIFIED DISORDERS OF THYROID: Chronic | ICD-10-CM

## 2022-01-10 DIAGNOSIS — Z98.89 OTHER SPECIFIED POSTPROCEDURAL STATES: Chronic | ICD-10-CM

## 2022-01-10 DIAGNOSIS — Z90.49 ACQUIRED ABSENCE OF OTHER SPECIFIED PARTS OF DIGESTIVE TRACT: Chronic | ICD-10-CM

## 2022-01-10 LAB
BLD GP AB SCN SERPL QL: SIGNIFICANT CHANGE UP
HCG UR QL: NEGATIVE — SIGNIFICANT CHANGE UP

## 2022-01-10 PROCEDURE — 88312 SPECIAL STAINS GROUP 1: CPT | Mod: 26

## 2022-01-10 PROCEDURE — 88307 TISSUE EXAM BY PATHOLOGIST: CPT | Mod: 26

## 2022-01-10 PROCEDURE — S2900 ROBOTIC SURGICAL SYSTEM: CPT | Mod: NC

## 2022-01-10 PROCEDURE — 43775 LAP SLEEVE GASTRECTOMY: CPT

## 2022-01-10 DEVICE — XI STAPLER SUREFORM RELOAD 60 BLACK: Type: IMPLANTABLE DEVICE | Status: FUNCTIONAL

## 2022-01-10 DEVICE — SEALANT VISTASEAL FIBRIN HUMAN 4ML: Type: IMPLANTABLE DEVICE | Status: FUNCTIONAL

## 2022-01-10 DEVICE — SEALANT TISSEEL PRE FILLED FROZEN 4ML: Type: IMPLANTABLE DEVICE | Status: FUNCTIONAL

## 2022-01-10 DEVICE — STAPLER COVIDIEN TRI-STAPLE 45MM PURPLE INTELLIGENT RELOAD: Type: IMPLANTABLE DEVICE | Status: FUNCTIONAL

## 2022-01-10 DEVICE — XI STAPLER SUREFORM RELOAD 60 WHITE: Type: IMPLANTABLE DEVICE | Status: FUNCTIONAL

## 2022-01-10 DEVICE — XI STAPLER SUREFORM RELOAD 60 BLUE: Type: IMPLANTABLE DEVICE | Status: FUNCTIONAL

## 2022-01-10 DEVICE — CLIP APPLIER COVIDIEN ENDOCLIP III 5MM: Type: IMPLANTABLE DEVICE | Status: FUNCTIONAL

## 2022-01-10 DEVICE — STAPLER COVIDIEN TRI-STAPLE 60MM BLACK INTELLIGENT RELOAD: Type: IMPLANTABLE DEVICE | Status: FUNCTIONAL

## 2022-01-10 DEVICE — XI STAPLER SUREFORM RELOAD 60 GREEN: Type: IMPLANTABLE DEVICE | Status: FUNCTIONAL

## 2022-01-10 DEVICE — LIGATING CLIPS SYNOVIS SUPERFINE MICROCLIP 6: Type: IMPLANTABLE DEVICE | Status: FUNCTIONAL

## 2022-01-10 RX ORDER — METOCLOPRAMIDE HCL 10 MG
10 TABLET ORAL EVERY 6 HOURS
Refills: 0 | Status: DISCONTINUED | OUTPATIENT
Start: 2022-01-10 | End: 2022-01-11

## 2022-01-10 RX ORDER — FENTANYL CITRATE 50 UG/ML
25 INJECTION INTRAVENOUS
Refills: 0 | Status: DISCONTINUED | OUTPATIENT
Start: 2022-01-10 | End: 2022-01-10

## 2022-01-10 RX ORDER — ENOXAPARIN SODIUM 100 MG/ML
40 INJECTION SUBCUTANEOUS ONCE
Refills: 0 | Status: COMPLETED | OUTPATIENT
Start: 2022-01-10 | End: 2022-01-10

## 2022-01-10 RX ORDER — ONDANSETRON 8 MG/1
4 TABLET, FILM COATED ORAL EVERY 6 HOURS
Refills: 0 | Status: DISCONTINUED | OUTPATIENT
Start: 2022-01-10 | End: 2022-01-11

## 2022-01-10 RX ORDER — SODIUM CHLORIDE 9 MG/ML
1000 INJECTION, SOLUTION INTRAVENOUS
Refills: 0 | Status: DISCONTINUED | OUTPATIENT
Start: 2022-01-10 | End: 2022-01-11

## 2022-01-10 RX ORDER — PANTOPRAZOLE SODIUM 20 MG/1
40 TABLET, DELAYED RELEASE ORAL EVERY 24 HOURS
Refills: 0 | Status: DISCONTINUED | OUTPATIENT
Start: 2022-01-10 | End: 2022-01-11

## 2022-01-10 RX ORDER — SODIUM CHLORIDE 9 MG/ML
3 INJECTION INTRAMUSCULAR; INTRAVENOUS; SUBCUTANEOUS EVERY 8 HOURS
Refills: 0 | Status: DISCONTINUED | OUTPATIENT
Start: 2022-01-10 | End: 2022-01-10

## 2022-01-10 RX ORDER — ACETAMINOPHEN 500 MG
1000 TABLET ORAL ONCE
Refills: 0 | Status: COMPLETED | OUTPATIENT
Start: 2022-01-10 | End: 2022-01-10

## 2022-01-10 RX ORDER — ONDANSETRON 8 MG/1
4 TABLET, FILM COATED ORAL EVERY 6 HOURS
Refills: 0 | Status: DISCONTINUED | OUTPATIENT
Start: 2022-01-10 | End: 2022-01-10

## 2022-01-10 RX ORDER — ACETAMINOPHEN 500 MG
1000 TABLET ORAL ONCE
Refills: 0 | Status: COMPLETED | OUTPATIENT
Start: 2022-01-11 | End: 2022-01-11

## 2022-01-10 RX ORDER — FENTANYL CITRATE 50 UG/ML
50 INJECTION INTRAVENOUS
Refills: 0 | Status: DISCONTINUED | OUTPATIENT
Start: 2022-01-10 | End: 2022-01-10

## 2022-01-10 RX ORDER — GABAPENTIN 400 MG/1
300 CAPSULE ORAL ONCE
Refills: 0 | Status: COMPLETED | OUTPATIENT
Start: 2022-01-10 | End: 2022-01-10

## 2022-01-10 RX ORDER — ONDANSETRON 8 MG/1
4 TABLET, FILM COATED ORAL ONCE
Refills: 0 | Status: COMPLETED | OUTPATIENT
Start: 2022-01-10 | End: 2022-01-10

## 2022-01-10 RX ORDER — CHOLECALCIFEROL (VITAMIN D3) 125 MCG
1 CAPSULE ORAL
Qty: 0 | Refills: 0 | DISCHARGE

## 2022-01-10 RX ORDER — SCOPALAMINE 1 MG/3D
1 PATCH, EXTENDED RELEASE TRANSDERMAL ONCE
Refills: 0 | Status: COMPLETED | OUTPATIENT
Start: 2022-01-10 | End: 2022-01-10

## 2022-01-10 RX ORDER — ENOXAPARIN SODIUM 100 MG/ML
40 INJECTION SUBCUTANEOUS EVERY 12 HOURS
Refills: 0 | Status: DISCONTINUED | OUTPATIENT
Start: 2022-01-11 | End: 2022-01-11

## 2022-01-10 RX ORDER — LEVOTHYROXINE SODIUM 125 MCG
1 TABLET ORAL
Qty: 0 | Refills: 0 | DISCHARGE

## 2022-01-10 RX ORDER — ACETAMINOPHEN 500 MG
1000 TABLET ORAL ONCE
Refills: 0 | Status: DISCONTINUED | OUTPATIENT
Start: 2022-01-10 | End: 2022-01-11

## 2022-01-10 RX ORDER — LEVOTHYROXINE SODIUM 125 MCG
100 TABLET ORAL AT BEDTIME
Refills: 0 | Status: DISCONTINUED | OUTPATIENT
Start: 2022-01-10 | End: 2022-01-11

## 2022-01-10 RX ADMIN — GABAPENTIN 300 MILLIGRAM(S): 400 CAPSULE ORAL at 06:54

## 2022-01-10 RX ADMIN — ENOXAPARIN SODIUM 40 MILLIGRAM(S): 100 INJECTION SUBCUTANEOUS at 06:56

## 2022-01-10 RX ADMIN — ONDANSETRON 4 MILLIGRAM(S): 8 TABLET, FILM COATED ORAL at 23:41

## 2022-01-10 RX ADMIN — Medication 1000 MILLIGRAM(S): at 13:15

## 2022-01-10 RX ADMIN — Medication 10 MILLIGRAM(S): at 20:09

## 2022-01-10 RX ADMIN — Medication 400 MILLIGRAM(S): at 20:09

## 2022-01-10 RX ADMIN — Medication 400 MILLIGRAM(S): at 13:00

## 2022-01-10 RX ADMIN — FENTANYL CITRATE 50 MICROGRAM(S): 50 INJECTION INTRAVENOUS at 10:47

## 2022-01-10 RX ADMIN — SCOPALAMINE 1 PATCH: 1 PATCH, EXTENDED RELEASE TRANSDERMAL at 18:53

## 2022-01-10 RX ADMIN — FENTANYL CITRATE 50 MICROGRAM(S): 50 INJECTION INTRAVENOUS at 10:32

## 2022-01-10 RX ADMIN — SODIUM CHLORIDE 3 MILLILITER(S): 9 INJECTION INTRAMUSCULAR; INTRAVENOUS; SUBCUTANEOUS at 06:58

## 2022-01-10 RX ADMIN — Medication 1000 MILLIGRAM(S): at 20:30

## 2022-01-10 RX ADMIN — ONDANSETRON 4 MILLIGRAM(S): 8 TABLET, FILM COATED ORAL at 17:39

## 2022-01-10 RX ADMIN — ONDANSETRON 4 MILLIGRAM(S): 8 TABLET, FILM COATED ORAL at 11:36

## 2022-01-10 RX ADMIN — SCOPALAMINE 1 PATCH: 1 PATCH, EXTENDED RELEASE TRANSDERMAL at 06:55

## 2022-01-10 RX ADMIN — Medication 100 MICROGRAM(S): at 22:39

## 2022-01-10 RX ADMIN — PANTOPRAZOLE SODIUM 40 MILLIGRAM(S): 20 TABLET, DELAYED RELEASE ORAL at 17:39

## 2022-01-10 NOTE — PATIENT PROFILE ADULT - FALL HARM RISK - UNIVERSAL INTERVENTIONS
Bed in lowest position, wheels locked, appropriate side rails in place/Call bell, personal items and telephone in reach/Instruct patient to call for assistance before getting out of bed or chair/Non-slip footwear when patient is out of bed/[place] to call system/Physically safe environment - no spills, clutter or unnecessary equipment/Purposeful Proactive Rounding/Room/bathroom lighting operational, light cord in reach

## 2022-01-10 NOTE — DISCHARGE NOTE NURSING/CASE MANAGEMENT/SOCIAL WORK - PATIENT PORTAL LINK FT
You can access the FollowMyHealth Patient Portal offered by [facility] by registering at the following website: [email or web address]. By joining [facility]’s FollowMyHealth portal, you will also be able to view your health information using other applications (apps) compatible with our system.

## 2022-01-10 NOTE — DISCHARGE NOTE NURSING/CASE MANAGEMENT/SOCIAL WORK - NSDCPEFALRISK_GEN_ALL_CORE
For information on Fall & Injury Prevention, visit: [email or web address] OR  [email or web address] OR  https://www.cdc.gov/steadi/patient.html

## 2022-01-10 NOTE — CHART NOTE - NSCHARTNOTEFT_GEN_A_CORE
36F w/morbid obesity presented for elective sleeve gastrectomy.  Under robotic assisted laparoscopic sleeve gastrectomy on 1/10/22    Patient seen and examined at bedside on surgical floor.  States that she has some mild nausea that is improved and pain is well controlled.  Denies any CP or sob.  She has not been able to ambulate yet but is eager to do so.    Vital Signs Last 24 Hrs  T(F): 99 (10 Champ 2022 12:32), Max: 99.1 (10 Champ 2022 12:02)  HR: 81 (10 Champ 2022 12:32) (78 - 98)  BP: 131/69 (10 Champ 2022 12:32) (115/62 - 145/82)  RR: 18 (10 Champ 2022 12:32) (14 - 20)  SpO2: 100% (10 Champ 2022 12:32) (97% - 100%)    Well appearing, sitting up in bed, nad  normal respiratory effort  abdomen soft, incision dressings c/d/i, minimal tenderness in epigastrum  SCDs in place, no calf tenderness    36F s/p robotic assisted laparoscopic sleeve gastrectomy for morbid obesity, POD 0  -pain control (non narcotic)  -ana protocol  -incentive spirometer, out of bed every hour for ambulation  -will advance to trial of ana clears tonight  -scd, lovenox in AM  -likely DC home tomorrow

## 2022-01-11 ENCOUNTER — TRANSCRIPTION ENCOUNTER (OUTPATIENT)
Age: 37
End: 2022-01-11

## 2022-01-11 VITALS
SYSTOLIC BLOOD PRESSURE: 131 MMHG | HEART RATE: 60 BPM | DIASTOLIC BLOOD PRESSURE: 80 MMHG | OXYGEN SATURATION: 99 % | TEMPERATURE: 99 F | RESPIRATION RATE: 18 BRPM

## 2022-01-11 PROCEDURE — 88312 SPECIAL STAINS GROUP 1: CPT

## 2022-01-11 PROCEDURE — 86900 BLOOD TYPING SEROLOGIC ABO: CPT

## 2022-01-11 PROCEDURE — 86901 BLOOD TYPING SEROLOGIC RH(D): CPT

## 2022-01-11 PROCEDURE — C1889: CPT

## 2022-01-11 PROCEDURE — 81025 URINE PREGNANCY TEST: CPT

## 2022-01-11 PROCEDURE — 88307 TISSUE EXAM BY PATHOLOGIST: CPT

## 2022-01-11 PROCEDURE — S2900: CPT

## 2022-01-11 PROCEDURE — 36415 COLL VENOUS BLD VENIPUNCTURE: CPT

## 2022-01-11 PROCEDURE — 86850 RBC ANTIBODY SCREEN: CPT

## 2022-01-11 RX ORDER — HYDROMORPHONE HYDROCHLORIDE 2 MG/ML
0.5 INJECTION INTRAMUSCULAR; INTRAVENOUS; SUBCUTANEOUS ONCE
Refills: 0 | Status: DISCONTINUED | OUTPATIENT
Start: 2022-01-11 | End: 2022-01-11

## 2022-01-11 RX ORDER — ACETAMINOPHEN 500 MG
20 TABLET ORAL
Qty: 300 | Refills: 0
Start: 2022-01-11 | End: 2022-01-15

## 2022-01-11 RX ORDER — ACETAMINOPHEN 500 MG
1000 TABLET ORAL ONCE
Refills: 0 | Status: DISCONTINUED | OUTPATIENT
Start: 2022-01-11 | End: 2022-01-11

## 2022-01-11 RX ORDER — OMEPRAZOLE 10 MG/1
1 CAPSULE, DELAYED RELEASE ORAL
Qty: 30 | Refills: 0
Start: 2022-01-11 | End: 2022-02-09

## 2022-01-11 RX ORDER — ONDANSETRON 8 MG/1
1 TABLET, FILM COATED ORAL
Qty: 21 | Refills: 0
Start: 2022-01-11 | End: 2022-01-17

## 2022-01-11 RX ADMIN — ONDANSETRON 4 MILLIGRAM(S): 8 TABLET, FILM COATED ORAL at 05:59

## 2022-01-11 RX ADMIN — ENOXAPARIN SODIUM 40 MILLIGRAM(S): 100 INJECTION SUBCUTANEOUS at 05:59

## 2022-01-11 RX ADMIN — Medication 400 MILLIGRAM(S): at 01:58

## 2022-01-11 RX ADMIN — HYDROMORPHONE HYDROCHLORIDE 0.5 MILLIGRAM(S): 2 INJECTION INTRAMUSCULAR; INTRAVENOUS; SUBCUTANEOUS at 06:40

## 2022-01-11 RX ADMIN — Medication 1000 MILLIGRAM(S): at 02:30

## 2022-01-11 RX ADMIN — HYDROMORPHONE HYDROCHLORIDE 0.5 MILLIGRAM(S): 2 INJECTION INTRAMUSCULAR; INTRAVENOUS; SUBCUTANEOUS at 06:07

## 2022-01-11 RX ADMIN — Medication 10 MILLIGRAM(S): at 01:58

## 2022-01-11 RX ADMIN — SCOPALAMINE 1 PATCH: 1 PATCH, EXTENDED RELEASE TRANSDERMAL at 10:44

## 2022-01-11 NOTE — ASU DISCHARGE PLAN (ADULT/PEDIATRIC) - ASU DC SPECIAL INSTRUCTIONSFT
please follow a high fiber diet and low fat diet  do not strain for bowel movements   drink lots of water and take stool softners as needed   no lifting of weights >10lb for 8 weeks   light exercises after 2 weeks with body weight but no strenuous exercises    follow up with dr Garnica in 1 week. call office to make an appointment

## 2022-01-11 NOTE — CHART NOTE - NSCHARTNOTEFT_GEN_A_CORE
Focus note:   Admit with  obesity (BMI 36.9). S/p sleeve gastrectomy 1/10. Discussed with RN.  Pt tolerating clear liquid diet (bariatric). Pt has  discharge instruction sheet and Bariatric Surgery Nutrition Guidelines. Reviewed diet protocols, emphasis on  adequate protein/fluid intake. Discussed support groups, exercise, sleep and follow-up with TING FARAH to monitor. GIULIANA ford

## 2022-01-11 NOTE — DISCHARGE NOTE PROVIDER - NSDCCPCAREPLAN_GEN_ALL_CORE_FT
PRINCIPAL DISCHARGE DIAGNOSIS  Diagnosis: Morbid obesity  Assessment and Plan of Treatment: You had a bariatric procedure to help with weight loss, please follow pre printed instructions provided by Dr. Faria

## 2022-01-11 NOTE — DISCHARGE NOTE PROVIDER - NSDCMRMEDTOKEN_GEN_ALL_CORE_FT
levothyroxine 137 mcg (0.137 mg) oral tablet: 1 tab(s) orally once a day  Vitamin D3 25 mcg (1000 intl units) oral tablet: 1 tab(s) orally once a day

## 2022-01-11 NOTE — DISCHARGE NOTE PROVIDER - CARE PROVIDER_API CALL
Dylan Faria)  Surgery  [address] [place] [address]  Phone: [phone]  Fax: [phone]  Established Patient  Scheduled Appointment: 01/19/2022

## 2022-01-11 NOTE — DISCHARGE NOTE PROVIDER - HOSPITAL COURSE
36F w/morbid obesity presented for elective sleeve gastrectomy.  Under robotic assisted laparoscopic sleeve gastrectomy on 1/10/22.  Procedure was uncomplicated and patient admitted to so surgical floor postoperatively. Patient had some postoperative nausea that resolved and she was advanced to Bariatric clear liquid diet which she was able to tolerate.   She was ambulating and pain was well controlled. Patient denied any chest pain or shortness of breath.  She was ambulating, using incentive spirometer and passing flatus.  Stable for DC on POD1.

## 2022-01-11 NOTE — DISCHARGE NOTE PROVIDER - NSDCCPTREATMENT_GEN_ALL_CORE_FT
PRINCIPAL PROCEDURE  Procedure: Gastrectomy, sleeve  Findings and Treatment: Please follow instructions provided previously by Dr. Faria  Additional Info:  Changes in how you eat  Here are suggestions to change how you eat:   Follow the diet that was prescribed for you in the hospital. You may need to have only liquids for 2 weeks after the surgery, or you may be able to eat pureed foods.   Drink liquids in smaller amounts than you used to. This will make it easier for your body to digest liquids. But, it is important that you continue to drink liquids (in small amounts) throughout the day so that you do not become dehydrated. Some signs of dehydration include dry mouth and dark urine.  Eat slowly. Plan on taking at least 20 to 30 minutes to finish a meal. Eating too much or too fast will cause nausea and vomiting.  Avoid the unpleasant effects of dumping syndrome. This can happen after a bariatric operation. The syndrome refers to stomach pain, nausea, vomiting, diarrhea, sweating, dizziness, rapid heartbeat, and bloating that happen after eating foods high in sugar, such as ice cream and milkshakes. Symptoms can happen within 10 to 30 minutes after eating, or sometimes as long as 2 to 3 hours after a meal.In some instances, fainting can also happen. So avoid foods that could cause this.  Take vitamin supplements as directed by your healthcare provider.    Activity  Here are suggestions for being active after surgery:   Keep in mind that recovery takes several weeks. It is normal to feel tired. Rest as needed.  Walk as often as you feel able. Increase your activity slowly.  Do not lift anything heavier than 10 pounds. Avoid any stomach-straining exercises until your healthcare provider says it's OK.   Avoid strenuous chores, such as vacuuming or lifting full bags of garbage, until your healthcare provider says it’s OK.  Climb stairs slowly and pause after every few steps.  You can benefit from simple activities, such as walking or gardening. Ask your healthcare provider how and when to get started.  Ask your healthcare provider when you can      SECONDARY PROCEDURE  Procedure: Gastrectomy, sleeve  Findings and Treatment:   When to call your healthcare provider  Call your healthcare provider right away if you have any of the following:  Cloudy or smelly drainage from the incision site  Redness, pain, or increased swelling at the incision site  Fever of 100.4°F (38°C) or higher, or shaking chills  Fast pulse  Night sweats  Sudden difficulty breathing or chest pain   Swelling or pain in your calves   Persistent pain, nausea, or vomiting after eating  Diarrhea beyond the first week after discharge  Pain in your upper back, chest, or left shoulder  Persistent hiccups  Confusion, depression, or unusual fatigue  Signs of bladder infection (urinating more often than usual; burning, pain, bleeding, or hesitancy when you urinate)      Procedure: Sleeve gastroplasty  Findings and Treatment:

## 2022-01-11 NOTE — BRIEF OPERATIVE NOTE - NSICDXBRIEFPREOP_GEN_ALL_CORE_FT
PRE-OP DIAGNOSIS:  Cholelithiases 11-Jan-2022 12:44:10  Aries Velasco  
PRE-OP DIAGNOSIS:  Morbid obesity 10-Champ-2022 09:54:25  Dylan Faria E

## 2022-01-11 NOTE — ASU DISCHARGE PLAN (ADULT/PEDIATRIC) - MEDICATION INSTRUCTIONS
take percocet only for severe pain, limit tylenol if you take this every 6 hours tylenol or motrin as needed for mild pain

## 2022-01-11 NOTE — ASU DISCHARGE PLAN (ADULT/PEDIATRIC) - NS MD DC FALL RISK RISK
For information on Fall & Injury Prevention, visit: [email or web address] OR  [email or web address] OR  https://www.cdc.gov/steadi/patient.html

## 2022-01-11 NOTE — ASU DISCHARGE PLAN (ADULT/PEDIATRIC) - CARE PROVIDER_API CALL
Sid Garnica)  Surgery  [address] [place], [place] [id number]  Phone: [phone]  Fax: [phone]  Established Patient  Follow Up Time: 1 week

## 2022-01-11 NOTE — BRIEF OPERATIVE NOTE - NSICDXBRIEFPOSTOP_GEN_ALL_CORE_FT
POST-OP DIAGNOSIS:  Cholelithiases 11-Jan-2022 12:44:23  Aries Velasco  
POST-OP DIAGNOSIS:  Morbid obesity 10-Champ-2022 09:54:39  Dylan Faria E

## 2022-01-11 NOTE — BRIEF OPERATIVE NOTE - NSICDXBRIEFPROCEDURE_GEN_ALL_CORE_FT
PROCEDURES:  Laparoscopic cholecystectomy 11-Jan-2022 12:43:53  Aries Velasco  
PROCEDURES:  Robot-assisted sleeve gastrectomy 10-Champ-2022 09:54:16  Dylan Faria E

## 2022-01-11 NOTE — PROGRESS NOTE ADULT - ASSESSMENT
36F s/p robotic assisted laparoscopic sleeve gastrectomy for morbid obesity, POD 1  -pain control (non narcotic)  -ana protocol  -incentive spirometer, out of bed every hour for ambulation  -patient advised to return to NPO until nausea resolves  -scd, lovenox  -DC when able to tolerate bariatric clears

## 2022-01-11 NOTE — BRIEF OPERATIVE NOTE - OPERATION/FINDINGS
No hiatal hernia seen.  Longitudinal sleeve gastrectomy formed over 36Fr [id number], with  negative leak test.
normal anatomy, gallbladder removed

## 2022-01-11 NOTE — DISCHARGE NOTE PROVIDER - NSDCFUSCHEDAPPT_GEN_ALL_CORE_FT
LIAM ALLEN ; 01/19/2022 ; [facility] [address]  ALEJANDRO LIAM GUSTAVO ; 01/19/2022 ; [facility] [address]  ALEJANDRO LIAM J ; 02/09/2022 ; [facility] [address]  ALEJANDRO LIAM GUSTAVO ; 02/09/2022 ; [facility] [address]

## 2022-01-13 LAB — SURGICAL PATHOLOGY STUDY: SIGNIFICANT CHANGE UP

## 2022-01-19 ENCOUNTER — APPOINTMENT (OUTPATIENT)
Dept: SURGERY | Facility: CLINIC | Age: 37
End: 2022-01-19
Payer: COMMERCIAL

## 2022-01-19 ENCOUNTER — APPOINTMENT (OUTPATIENT)
Dept: BARIATRICS | Facility: CLINIC | Age: 37
End: 2022-01-19

## 2022-01-19 VITALS
OXYGEN SATURATION: 99 % | BODY MASS INDEX: 33.8 KG/M2 | DIASTOLIC BLOOD PRESSURE: 84 MMHG | HEART RATE: 89 BPM | HEIGHT: 64 IN | WEIGHT: 198 LBS | SYSTOLIC BLOOD PRESSURE: 130 MMHG

## 2022-01-19 VITALS — TEMPERATURE: 97 F

## 2022-01-19 PROCEDURE — 99024 POSTOP FOLLOW-UP VISIT: CPT

## 2022-01-19 NOTE — DATA REVIEWED
[FreeTextEntry1] : Patient:   LIAM ALLEN\par \par \par Accession:                             [id number]\par \par Collected Date/Time:                   1/10/2022 09:45 EST\par Received Date/Time:                    1/11/2022 09:55 EST\par \par Surgical Pathology Report - Auth (Verified)\par \par Specimen(s) Submitted\par 1  Portion of Stomach\par \par Final Diagnosis\par \par Portion of stomach; robotically assisted laparoscopic sleeve gastrostomy:\par - Gastric wall segment with submucosal hypertrophy of adipose tissue.\par - Cresyl violet stain is negative for H pylori-like microorganisms.\par \par Verified by: Sara Beavers MD\par (Electronic Signature)\par Reported on: 01/13/22 12:03 EST, [address]\par Phone: [phone]   Fax: [phone]\par _________________________________________________________________\par \par Clinical Information\par \par Morbid obesity\par

## 2022-01-19 NOTE — PLAN
[FreeTextEntry1] : \par I reviewed importance of behavioral modification and follow-up in order to optimize outcomes and avoid complications. Post-operative nutrition again reviewed and reinforced, including the importance of taking supplements, making healthy food choices. The importance of maintaining regular exercise/physical activity to maximize progress also reinforced. Recommend patient to see nutritionist and attend support groups. Patient's questions and concerns addressed to patient's satisfaction. \par \par patient will follow up in one month or if needed. Warning signs, follow up, and restrictions were discussed with the patient.

## 2022-01-19 NOTE — ASSESSMENT
[FreeTextEntry1] : Ms. ALLEN is doing well, with excellent post-operative recovery. All surgical incisions are healing well and as expected. There is no evidence of infection or complication, and she is progressing as expected. Post-operative wound care, activity, restrictions and precautions reinforced. Patient instructed to refrain from any heavy lifting greater than 10-15 pounds for at least 4 weeks post-operatively. pathology results were discussed in details.  Patient's questions and concerns addressed to patient's satisfaction.

## 2022-01-19 NOTE — HISTORY OF PRESENT ILLNESS
[Procedure: ___] : Procedure performed: [unfilled]  [de-identified] : Ms. ALLEN  is s/p robotically assisted laparoscopic sleeve gastrostomy on 01/10/2022.  Patient feels well, denies fever, chills, nausea or emesis, and has been tolerating liquid  diet without difficulty. She denied   reflux. Patient has no chest pain or shortness of breath. Patient had no complaints at this time. She is reporting appropriate rapid and prolonged satiety. Patient reports good bowel movements. she reports compliance with protein intake. she reports drinking small amount of liquids.  \par

## 2022-02-09 ENCOUNTER — APPOINTMENT (OUTPATIENT)
Dept: SURGERY | Facility: CLINIC | Age: 37
End: 2022-02-09
Payer: COMMERCIAL

## 2022-02-09 ENCOUNTER — APPOINTMENT (OUTPATIENT)
Dept: BARIATRICS | Facility: CLINIC | Age: 37
End: 2022-02-09

## 2022-02-09 VITALS
OXYGEN SATURATION: 99 % | HEART RATE: 84 BPM | HEIGHT: 64 IN | DIASTOLIC BLOOD PRESSURE: 71 MMHG | WEIGHT: 193 LBS | SYSTOLIC BLOOD PRESSURE: 107 MMHG | BODY MASS INDEX: 32.95 KG/M2

## 2022-02-09 VITALS — TEMPERATURE: 96.8 F

## 2022-02-09 PROCEDURE — 99024 POSTOP FOLLOW-UP VISIT: CPT

## 2022-02-09 NOTE — HISTORY OF PRESENT ILLNESS
[Procedure: ___] : Procedure performed: [unfilled]  [Date of Surgery: ___] : Date of Surgery:   [unfilled] [Surgeon Name:   ___] : Surgeon Name: Dr. OLIVEIRA [Pre-Op Weight ___] : Pre-op weight was [unfilled] lbs [de-identified] : Ms. ALLEN  is s/p robotically assisted laparoscopic sleeve gastrostomy on 01/10/2022.  Patient feels well, denies fever, chills, nausea or emesis, and has been tolerating whole foods without difficulty. She denied   reflux. Patient has no chest pain or shortness of breath. Patient had no complaints at this time. She is reporting appropriate rapid and prolonged satiety. Patient reports good bowel movements. she reports compliance with protein intake. she reports drinking small amount of liquids.  she does not exercise and tries to stay active. she states that her PMD stopped her DM medications and  decrease her levothyroxine. \par  [de-identified] : Patient reports no interval changes to medications, medical history or overall health status.\par

## 2022-02-09 NOTE — ASSESSMENT
[FreeTextEntry1] : Ms. ALLEN is doing well, with excellent post-operative recovery. All surgical incisions healed  well and as expected. There is no evidence of complication, and she is progressing as expected.   Patient's questions and concerns addressed to patient's satisfaction.

## 2022-02-09 NOTE — PLAN
[FreeTextEntry1] : I reviewed importance of behavioral modification and follow-up in order to optimize outcomes and avoid complications. Post-operative nutrition again reviewed and reinforced, including the importance of taking supplements, making healthy food choices. The importance of maintaining regular exercise/physical activity to maximize progress also reinforced. Recommend patient to see nutritionist and attend support groups. Patient's questions and concerns addressed to patient's satisfaction. \par \par patient will follow up in one  month or sooner if needed. Warning signs, follow up, and restrictions were discussed with the patient. \par \par She will see our nutritionist at her next visit. \par

## 2022-04-26 PROBLEM — G47.33 OBSTRUCTIVE SLEEP APNEA (ADULT) (PEDIATRIC): Chronic | Status: ACTIVE | Noted: 2021-12-30

## 2022-04-26 PROBLEM — E66.01 MORBID (SEVERE) OBESITY DUE TO EXCESS CALORIES: Chronic | Status: ACTIVE | Noted: 2021-12-30

## 2022-05-04 ENCOUNTER — APPOINTMENT (OUTPATIENT)
Dept: SURGERY | Facility: CLINIC | Age: 37
End: 2022-05-04
Payer: COMMERCIAL

## 2022-05-04 VITALS
HEIGHT: 64 IN | BODY MASS INDEX: 29.88 KG/M2 | HEART RATE: 69 BPM | WEIGHT: 175 LBS | DIASTOLIC BLOOD PRESSURE: 71 MMHG | SYSTOLIC BLOOD PRESSURE: 112 MMHG

## 2022-05-04 VITALS — TEMPERATURE: 97.2 F

## 2022-05-04 PROCEDURE — 99213 OFFICE O/P EST LOW 20 MIN: CPT

## 2022-05-04 NOTE — PLAN
[FreeTextEntry1] : I reviewed importance of behavioral modification and follow-up in order to optimize outcomes and avoid complications. Post-operative nutrition again reviewed and reinforced, including the importance of taking supplements, making healthy food choices. The importance of maintaining regular exercise/physical activity to maximize progress also reinforced. Recommend patient to see nutritionist and she will see her today.   she wants to have her blood work done tomorrow in her PMDs office and will have them fax us the results.   Patient's questions and concerns addressed to patient's satisfaction.

## 2022-05-04 NOTE — ASSESSMENT
[FreeTextEntry1] : Ms. ALLEN  is s/p robotically assisted laparoscopic sleeve gastrostomy on 01/10/2022.  \par Ms. ALLEN is doing well, with excellent post-operative recovery.  There is no evidence of i complication, and she is progressing as expected.    Patient's questions and concerns addressed to patient's satisfaction.

## 2022-05-04 NOTE — HISTORY OF PRESENT ILLNESS
[Procedure: ___] : Procedure performed: [unfilled]  [Date of Surgery: ___] : Date of Surgery:   [unfilled] [Surgeon Name:   ___] : Surgeon Name: Dr. OLIVEIRA [Pre-Op Weight ___] : Pre-op weight was [unfilled] lbs [de-identified] : Ms. LALEN  is s/p robotically assisted laparoscopic sleeve gastrostomy on 01/10/2022.  Patient feels well, denies fever, chills, nausea or emesis, and has been tolerating whole foods without difficulty. She denied reflux. Patient has no chest pain or shortness of breath. Patient had no complaints at this time. She is reporting appropriate rapid and prolonged satiety. Patient reports good bowel movements. she reports compliance with protein intake. she reports drinking small amount of liquids. she does not exercise and tries to stay active.  her only concern today is a hair lose. she is very happy with her progress.  she is scheduled to see her PMD tomorrow.

## 2022-06-04 NOTE — PHYSICAL EXAM
[Obese, well nourished, in no acute distress] : obese, well nourished, in no acute distress [Normal] : affect appropriate [de-identified] : Soft, nontender, nondistended. No masses, rebound or guarding. Incisions well-healed, without erythema or drainage.  No hernias palpable. 89

## 2022-06-12 NOTE — PROGRESS NOTE ADULT - SUBJECTIVE AND OBJECTIVE BOX
36F w/morbid obesity presented for elective sleeve gastrectomy.  Under robotic assisted laparoscopic sleeve gastrectomy on 1/10/22    Patient seen and examined at bedside on surgical floor.  States that she has continued nausea that kept her up overnight with epigastric discomfort.  She stated that she tried sips of water and felt more nauseous.  Advised to return to npo until nausea resolves.  She is ambulating and pain is well controlled.    Vital Signs Last 24 Hrs  T(F): 98.8 (11 Jan 2022 05:40), Max: 99.3 (10 Champ 2022 20:25)  HR: 65 (11 Jan 2022 05:40) (65 - 98)  BP: 120/67 (11 Jan 2022 05:40) (105/49 - 145/82)  RR: 17 (11 Jan 2022 05:40) (14 - 20)  SpO2: 100% (11 Jan 2022 05:40) (97% - 100%)    Well appearing, sitting up in bed, nad  normal respiratory effort  abdomen soft, incision dressings c/d/i, minimal tenderness in epigastrum  SCDs in place, no calf tenderness    MEDICATIONS  (STANDING):  acetaminophen   IVPB .. 1000 milliGRAM(s) IV Intermittent once  acetaminophen   IVPB .. 1000 milliGRAM(s) IV Intermittent once  enoxaparin Injectable 40 milliGRAM(s) SubCutaneous every 12 hours  lactated ringers. 1000 milliLiter(s) (120 mL/Hr) IV Continuous <Continuous>  levothyroxine Injectable 100 MICROGram(s) IV Push at bedtime  metoclopramide Injectable 10 milliGRAM(s) IV Push every 6 hours  ondansetron Injectable 4 milliGRAM(s) IV Push every 6 hours  pantoprazole  Injectable 40 milliGRAM(s) IV Push every 24 hours    MEDICATIONS  (PRN):       Attending with

## 2022-09-02 NOTE — ED PROVIDER NOTE - PMH
[FreeTextEntry1] : 7-22-22:\par Pt with wound s/p fall that is non healing.  Has seen dermatoilogy and refered her\par Pt states her legs swell on occasion and she has spider veins\par On exam:\par LLE wound with soft eschar, no s/s of infection\par s/p excisional debridement\par spider veins noted\par \par 8/3/2022\par Pt here for f/u\par Wound appears smaller\par Patient to schedule vascular testing\par On exam:\par slough to wound bed\par No s/s of infection\par s/p excisional debridement\par \par \par 8/19/22\par Patient presents for f/u\par LLE ulcer is nearly closed with healthy epithelial tissue. No local or systemic signs/symptoms of infection. No purulent discharge, no blanching erythema, no malodor, no crepitus or bullae formation. \par Excisional debridement of wound slough and non-viable/necrotic tissue was performed to a maximum depth of 0.2 cm i\par Given findings of severe B/L superficial venous system reflux in the presence of wound formation, patient was counseled as to the benefit of both consistent compression therapy as well as GSV ablative procedures. She was measured for prescription compression stockings, script written. She will consider saphenous ablation.\par 9/1/22\par left leg wound healed\par superficial venous insufficiency, discussed compression socks use Hypothyroidism, unspecified type

## 2023-01-11 ENCOUNTER — NON-APPOINTMENT (OUTPATIENT)
Age: 38
End: 2023-01-11

## 2023-01-13 ENCOUNTER — NON-APPOINTMENT (OUTPATIENT)
Age: 38
End: 2023-01-13

## 2023-01-18 ENCOUNTER — LABORATORY RESULT (OUTPATIENT)
Age: 38
End: 2023-01-18

## 2023-01-18 ENCOUNTER — APPOINTMENT (OUTPATIENT)
Dept: SURGERY | Facility: CLINIC | Age: 38
End: 2023-01-18
Payer: COMMERCIAL

## 2023-01-18 VITALS
TEMPERATURE: 97.3 F | BODY MASS INDEX: 26.48 KG/M2 | SYSTOLIC BLOOD PRESSURE: 106 MMHG | HEIGHT: 64 IN | DIASTOLIC BLOOD PRESSURE: 71 MMHG | WEIGHT: 155.1 LBS | HEART RATE: 91 BPM

## 2023-01-18 DIAGNOSIS — E66.01 MORBID (SEVERE) OBESITY DUE TO EXCESS CALORIES: ICD-10-CM

## 2023-01-18 PROCEDURE — 99214 OFFICE O/P EST MOD 30 MIN: CPT

## 2023-01-18 NOTE — HISTORY OF PRESENT ILLNESS
[Procedure: ___] : Procedure performed: [unfilled]  [Date of Surgery: ___] : Date of Surgery:   [unfilled] [Surgeon Name:   ___] : Surgeon Name: Dr. OLIVEIRA [Pre-Op Weight ___] : Pre-op weight was [unfilled] lbs [de-identified] : Ms. ALLEN  is s/p robotically assisted laparoscopic sleeve gastrostomy on 01/10/2022.  Patient feels well, denies fever, chills, nausea or emesis, and has been tolerating whole foods without difficulty. She denied reflux. Patient has no chest pain or shortness of breath. Patient had no complaints at this time. She is reporting appropriate rapid and prolonged satiety. Patient reports good bowel movements. she reports compliance with protein intake. she reports drinking small amount of liquids. she reports being inactive the whole month of December due to COVID and Flu infection and now  plans to increase her  activity level.    she is very happy with her progress.  she is scheduled to see her PMD in February  [de-identified] : Patient reports no interval changes to her overall health status or medical history

## 2023-01-18 NOTE — REVIEW OF SYSTEMS
[Negative] : Allergic/Immunologic [Abdominal Pain] : no abdominal pain [Reflux/Heartburn] : no reflux/ heartburn

## 2023-01-18 NOTE — ASSESSMENT
[FreeTextEntry1] : Patient with history of severe obesity s/p sleeve gastrecomy.  \par \par Patient is doing well since the prior visit, with excellent interval and overall  progress.  Patient is tolerating an appropriate portion controlled and nutritionally balanced diet without difficulty.  \par \par

## 2023-01-18 NOTE — PLAN
[FreeTextEntry1] : I reviewed importance of behavioral modification and follow-up in order to optimize outcomes and avoid complications. Post-operative nutrition again reviewed and reinforced, including the importance of taking supplements, making healthy food choices.  \par The importance of maintaining regular exercise/physical activity to maximize progress also reinforced. \par \par Recommend patient to see nutritionist and attend support groups.\par \par Patient's questions and concerns addressed to patient's satisfaction. \par \par We will check a full set of blood work today and supplement as needed.

## 2023-01-23 LAB
24R-OH-CALCIDIOL SERPL-MCNC: 99.6 PG/ML
ANION GAP SERPL CALC-SCNC: 10 MMOL/L
APTT BLD: 27.7 SEC
BASOPHILS # BLD AUTO: 0.06 K/UL
BASOPHILS NFR BLD AUTO: 1.1 %
BUN SERPL-MCNC: 15 MG/DL
CALCIUM SERPL-MCNC: 9.7 MG/DL
CALCIUM SERPL-MCNC: 9.7 MG/DL
CHLORIDE SERPL-SCNC: 102 MMOL/L
CHOLEST SERPL-MCNC: 152 MG/DL
CO2 SERPL-SCNC: 28 MMOL/L
CREAT SERPL-MCNC: 0.61 MG/DL
EGFR: 117 ML/MIN/1.73M2
EOSINOPHIL # BLD AUTO: 0.21 K/UL
EOSINOPHIL NFR BLD AUTO: 3.8 %
ESTIMATED AVERAGE GLUCOSE: 108 MG/DL
FOLATE SERPL-MCNC: >20 NG/ML
GLUCOSE SERPL-MCNC: 70 MG/DL
HBA1C MFR BLD HPLC: 5.4 %
HCT VFR BLD CALC: 37.2 %
HDLC SERPL-MCNC: 51 MG/DL
HGB BLD-MCNC: 11.8 G/DL
IMM GRANULOCYTES NFR BLD AUTO: 0.2 %
INR PPP: 1.08 RATIO
IRON SERPL-MCNC: 94 UG/DL
LDLC SERPL CALC-MCNC: 88 MG/DL
LYMPHOCYTES # BLD AUTO: 2.01 K/UL
LYMPHOCYTES NFR BLD AUTO: 36.3 %
MAGNESIUM SERPL-MCNC: 1.7 MG/DL
MAN DIFF?: NORMAL
MCHC RBC-ENTMCNC: 30.7 PG
MCHC RBC-ENTMCNC: 31.7 GM/DL
MCV RBC AUTO: 96.9 FL
MONOCYTES # BLD AUTO: 0.77 K/UL
MONOCYTES NFR BLD AUTO: 13.9 %
NEUTROPHILS # BLD AUTO: 2.47 K/UL
NEUTROPHILS NFR BLD AUTO: 44.7 %
NONHDLC SERPL-MCNC: 100 MG/DL
PARATHYROID HORMONE INTACT: 27 PG/ML
PLATELET # BLD AUTO: 202 K/UL
POTASSIUM SERPL-SCNC: 4.3 MMOL/L
PT BLD: 12.7 SEC
RBC # BLD: 3.84 M/UL
RBC # FLD: 13.6 %
SODIUM SERPL-SCNC: 140 MMOL/L
TRIGL SERPL-MCNC: 64 MG/DL
TSH SERPL-ACNC: 6.62 UIU/ML
VIT B12 SERPL-MCNC: 803 PG/ML
WBC # FLD AUTO: 5.53 K/UL

## 2023-01-26 ENCOUNTER — APPOINTMENT (OUTPATIENT)
Dept: PLASTIC SURGERY | Facility: CLINIC | Age: 38
End: 2023-01-26
Payer: COMMERCIAL

## 2023-01-26 VITALS
BODY MASS INDEX: 26.12 KG/M2 | HEART RATE: 72 BPM | SYSTOLIC BLOOD PRESSURE: 110 MMHG | TEMPERATURE: 98.6 F | WEIGHT: 153 LBS | OXYGEN SATURATION: 98 % | DIASTOLIC BLOOD PRESSURE: 71 MMHG | HEIGHT: 64 IN

## 2023-01-26 DIAGNOSIS — M95.0 ACQUIRED DEFORMITY OF NOSE: ICD-10-CM

## 2023-01-26 DIAGNOSIS — J34.2 DEVIATED NASAL SEPTUM: ICD-10-CM

## 2023-01-26 DIAGNOSIS — J34.89 OTHER SPECIFIED DISORDERS OF NOSE AND NASAL SINUSES: ICD-10-CM

## 2023-01-26 DIAGNOSIS — J34.3 HYPERTROPHY OF NASAL TURBINATES: ICD-10-CM

## 2023-01-26 PROCEDURE — 99204 OFFICE O/P NEW MOD 45 MIN: CPT

## 2023-01-26 NOTE — PHYSICAL EXAM
[de-identified] : septal deviation; turbinate hypertrophy; no mucosal lesions; no septal perforation; nasal pyramid asymmetry; asymmetric dorsal aesthetic lines; wide nasal pyramid; poor tip support; tip ptosis with poor rotation; adequate soft tissue projection; dorsal convexity

## 2023-01-26 NOTE — HISTORY OF PRESENT ILLNESS
[FreeTextEntry1] : 36 yo female presents with nasal obstruction and deformity.  She complains of episodes of sinusitis.  She has had 2 CT scans, performed in 2019 and .  No surgical interventions have been done.  She has a history of nasal trauma in , secondary to blunt force.  However, sutures were required.  She has failed conservative treatment including breathe rite strips and nasal saline spray.\par \par The patient is unhappy with the appearance of her nose.  She states that the profile view does bother her.  She complains of tip position. \par \par The patient has history of obesity treated with gastric sleeve surgery.  She has ADHD and hypothyroidisim (secondary to total thyroidectomy).  She has a history of sleep apnea for which she uses CPAP.  She has not been tested since weight loss. \par \par The patient has NKDA.  \par \par She does not smoke.  She works in medical collections. She has 1 child born via .

## 2023-02-09 ENCOUNTER — APPOINTMENT (OUTPATIENT)
Dept: OTOLARYNGOLOGY | Facility: CLINIC | Age: 38
End: 2023-02-09

## 2023-08-19 ENCOUNTER — EMERGENCY (EMERGENCY)
Facility: HOSPITAL | Age: 38
LOS: 1 days | Discharge: HOME | End: 2023-08-19
Attending: EMERGENCY MEDICINE
Payer: COMMERCIAL

## 2023-08-19 VITALS
HEART RATE: 90 BPM | DIASTOLIC BLOOD PRESSURE: 76 MMHG | RESPIRATION RATE: 16 BRPM | OXYGEN SATURATION: 98 % | SYSTOLIC BLOOD PRESSURE: 122 MMHG | TEMPERATURE: 97 F

## 2023-08-19 VITALS
SYSTOLIC BLOOD PRESSURE: 128 MMHG | RESPIRATION RATE: 16 BRPM | OXYGEN SATURATION: 98 % | TEMPERATURE: 98 F | DIASTOLIC BLOOD PRESSURE: 80 MMHG | WEIGHT: 149.91 LBS | HEART RATE: 90 BPM

## 2023-08-19 DIAGNOSIS — Z90.49 ACQUIRED ABSENCE OF OTHER SPECIFIED PARTS OF DIGESTIVE TRACT: Chronic | ICD-10-CM

## 2023-08-19 DIAGNOSIS — Z98.89 OTHER SPECIFIED POSTPROCEDURAL STATES: Chronic | ICD-10-CM

## 2023-08-19 DIAGNOSIS — E07.89 OTHER SPECIFIED DISORDERS OF THYROID: Chronic | ICD-10-CM

## 2023-08-19 LAB
ALBUMIN SERPL ELPH-MCNC: 4.1 G/DL — SIGNIFICANT CHANGE UP (ref 3.5–5)
ALP SERPL-CCNC: 58 U/L — SIGNIFICANT CHANGE UP (ref 40–120)
ALT FLD-CCNC: 29 U/L DA — SIGNIFICANT CHANGE UP (ref 10–60)
ANION GAP SERPL CALC-SCNC: 4 MMOL/L — LOW (ref 5–17)
AST SERPL-CCNC: 21 U/L — SIGNIFICANT CHANGE UP (ref 10–40)
BASOPHILS # BLD AUTO: 0.01 K/UL — SIGNIFICANT CHANGE UP (ref 0–0.2)
BASOPHILS NFR BLD AUTO: 0.1 % — SIGNIFICANT CHANGE UP (ref 0–2)
BILIRUB SERPL-MCNC: 1.5 MG/DL — HIGH (ref 0.2–1.2)
BUN SERPL-MCNC: 14 MG/DL — SIGNIFICANT CHANGE UP (ref 7–18)
CALCIUM SERPL-MCNC: 8.8 MG/DL — SIGNIFICANT CHANGE UP (ref 8.4–10.5)
CHLORIDE SERPL-SCNC: 107 MMOL/L — SIGNIFICANT CHANGE UP (ref 96–108)
CO2 SERPL-SCNC: 24 MMOL/L — SIGNIFICANT CHANGE UP (ref 22–31)
CREAT SERPL-MCNC: 0.71 MG/DL — SIGNIFICANT CHANGE UP (ref 0.5–1.3)
EGFR: 110 ML/MIN/1.73M2 — SIGNIFICANT CHANGE UP
EOSINOPHIL # BLD AUTO: 0.01 K/UL — SIGNIFICANT CHANGE UP (ref 0–0.5)
EOSINOPHIL NFR BLD AUTO: 0.1 % — SIGNIFICANT CHANGE UP (ref 0–6)
GLUCOSE SERPL-MCNC: 98 MG/DL — SIGNIFICANT CHANGE UP (ref 70–99)
HCG SERPL-ACNC: <1 MIU/ML — SIGNIFICANT CHANGE UP
HCT VFR BLD CALC: 43.5 % — SIGNIFICANT CHANGE UP (ref 34.5–45)
HGB BLD-MCNC: 14.1 G/DL — SIGNIFICANT CHANGE UP (ref 11.5–15.5)
IMM GRANULOCYTES NFR BLD AUTO: 0.2 % — SIGNIFICANT CHANGE UP (ref 0–0.9)
LACTATE SERPL-SCNC: 0.9 MMOL/L — SIGNIFICANT CHANGE UP (ref 0.7–2)
LIDOCAIN IGE QN: 47 U/L — LOW (ref 73–393)
LYMPHOCYTES # BLD AUTO: 0.57 K/UL — LOW (ref 1–3.3)
LYMPHOCYTES # BLD AUTO: 6.8 % — LOW (ref 13–44)
MCHC RBC-ENTMCNC: 30.6 PG — SIGNIFICANT CHANGE UP (ref 27–34)
MCHC RBC-ENTMCNC: 32.4 GM/DL — SIGNIFICANT CHANGE UP (ref 32–36)
MCV RBC AUTO: 94.4 FL — SIGNIFICANT CHANGE UP (ref 80–100)
MONOCYTES # BLD AUTO: 0.65 K/UL — SIGNIFICANT CHANGE UP (ref 0–0.9)
MONOCYTES NFR BLD AUTO: 7.8 % — SIGNIFICANT CHANGE UP (ref 2–14)
NEUTROPHILS # BLD AUTO: 7.08 K/UL — SIGNIFICANT CHANGE UP (ref 1.8–7.4)
NEUTROPHILS NFR BLD AUTO: 85 % — HIGH (ref 43–77)
NRBC # BLD: 0 /100 WBCS — SIGNIFICANT CHANGE UP (ref 0–0)
PLATELET # BLD AUTO: 221 K/UL — SIGNIFICANT CHANGE UP (ref 150–400)
POTASSIUM SERPL-MCNC: 3.6 MMOL/L — SIGNIFICANT CHANGE UP (ref 3.5–5.3)
POTASSIUM SERPL-SCNC: 3.6 MMOL/L — SIGNIFICANT CHANGE UP (ref 3.5–5.3)
PROT SERPL-MCNC: 8 G/DL — SIGNIFICANT CHANGE UP (ref 6–8.3)
RBC # BLD: 4.61 M/UL — SIGNIFICANT CHANGE UP (ref 3.8–5.2)
RBC # FLD: 13.3 % — SIGNIFICANT CHANGE UP (ref 10.3–14.5)
SODIUM SERPL-SCNC: 135 MMOL/L — SIGNIFICANT CHANGE UP (ref 135–145)
WBC # BLD: 8.34 K/UL — SIGNIFICANT CHANGE UP (ref 3.8–10.5)
WBC # FLD AUTO: 8.34 K/UL — SIGNIFICANT CHANGE UP (ref 3.8–10.5)

## 2023-08-19 PROCEDURE — 36415 COLL VENOUS BLD VENIPUNCTURE: CPT

## 2023-08-19 PROCEDURE — 83690 ASSAY OF LIPASE: CPT

## 2023-08-19 PROCEDURE — 99284 EMERGENCY DEPT VISIT MOD MDM: CPT | Mod: 25

## 2023-08-19 PROCEDURE — 96375 TX/PRO/DX INJ NEW DRUG ADDON: CPT

## 2023-08-19 PROCEDURE — 83605 ASSAY OF LACTIC ACID: CPT

## 2023-08-19 PROCEDURE — 99284 EMERGENCY DEPT VISIT MOD MDM: CPT

## 2023-08-19 PROCEDURE — 80053 COMPREHEN METABOLIC PANEL: CPT

## 2023-08-19 PROCEDURE — 84702 CHORIONIC GONADOTROPIN TEST: CPT

## 2023-08-19 PROCEDURE — 96374 THER/PROPH/DIAG INJ IV PUSH: CPT

## 2023-08-19 PROCEDURE — 85025 COMPLETE CBC W/AUTO DIFF WBC: CPT

## 2023-08-19 RX ORDER — ONDANSETRON 8 MG/1
1 TABLET, FILM COATED ORAL
Qty: 15 | Refills: 0
Start: 2023-08-19 | End: 2023-08-23

## 2023-08-19 RX ORDER — ONDANSETRON 8 MG/1
4 TABLET, FILM COATED ORAL ONCE
Refills: 0 | Status: COMPLETED | OUTPATIENT
Start: 2023-08-19 | End: 2023-08-19

## 2023-08-19 RX ORDER — SODIUM CHLORIDE 9 MG/ML
1000 INJECTION INTRAMUSCULAR; INTRAVENOUS; SUBCUTANEOUS ONCE
Refills: 0 | Status: COMPLETED | OUTPATIENT
Start: 2023-08-19 | End: 2023-08-19

## 2023-08-19 RX ORDER — FAMOTIDINE 10 MG/ML
20 INJECTION INTRAVENOUS ONCE
Refills: 0 | Status: COMPLETED | OUTPATIENT
Start: 2023-08-19 | End: 2023-08-19

## 2023-08-19 RX ORDER — FAMOTIDINE 10 MG/ML
1 INJECTION INTRAVENOUS
Qty: 14 | Refills: 0
Start: 2023-08-19 | End: 2023-08-25

## 2023-08-19 RX ADMIN — SODIUM CHLORIDE 1000 MILLILITER(S): 9 INJECTION INTRAMUSCULAR; INTRAVENOUS; SUBCUTANEOUS at 15:59

## 2023-08-19 RX ADMIN — Medication 30 MILLILITER(S): at 15:58

## 2023-08-19 RX ADMIN — FAMOTIDINE 20 MILLIGRAM(S): 10 INJECTION INTRAVENOUS at 15:59

## 2023-08-19 RX ADMIN — ONDANSETRON 4 MILLIGRAM(S): 8 TABLET, FILM COATED ORAL at 15:59

## 2023-08-19 RX ADMIN — SODIUM CHLORIDE 1000 MILLILITER(S): 9 INJECTION INTRAMUSCULAR; INTRAVENOUS; SUBCUTANEOUS at 16:00

## 2023-08-19 NOTE — ED ADULT NURSE NOTE - OBJECTIVE STATEMENT
pt  is a 39 y/o  female  with c/o  abdominal pain started  yesterday  with nausea and  vomiting  pt  states  its  like a cramping  sensation. pt  abdomen  soft  non  distended  +  BS

## 2023-08-19 NOTE — ED PROVIDER NOTE - NSCAREINITIATED _GEN_ER
PATIENT DEMOGRAPHICS:  Enrico Chairez 2021 1 y.o. male  Accompanied by: Parents  Preferred language: English  Visit on 9/23/2021    HISTORY:  Questions or concerns today: Eczema and skin picking  Interval history:    Specialist follow up: No   ED/UC visits since last appointment: ED visit for RSV 2 weeks ago, concerns resolved today, does note some picking/scratching at [facility] admissions since last appointment: No     Safety:    Counseling provided on transitioning to forward facing car seat, not allowing baby to sleep in the car-seat while at home or overnight, keeping straps tight enough for only two fingers to pass through, and avoiding letting baby sit or sleep in the car seat with straps unfastened   Parent verifies having car seat: Yes    Parent verifies having a smoke detector in their home: Yes   History of any immunization reactions: No   Other safety concerns: No    Past medical history:  Past Medical History:   Diagnosis Date    Fetal drug exposure     Maternal Hx of THC usage. Received opiates in labor. Past surgical history:  Past Surgical History:   Procedure Laterality Date    CIRCUMCISION       Social history:    Primary caregivers: Mother and Father   Smoking in the home: Yes - advised to quit or at minimum reduce child's exposure to smoke (smoking outside, changing clothes after smoking, washing hands after smoking), resources offered for caregiver cessation    Family history:   Family History   Problem Relation Age of Onset    Diabetes Paternal Grandmother     Asthma Neg Hx     Heart Attack Neg Hx     High Blood Pressure Neg Hx     High Cholesterol Neg Hx      Family history of strabismus or childhood vision loss: No     Medications:  No current outpatient medications on file prior to visit. No current facility-administered medications on file prior to visit. Allergies:    Allergies   Allergen Reactions    Milk-Related Compounds Nausea And Vomiting Carlos Ghosh(Attending) Nutrition:   Good appetite: Yes    Good variety: Yes   Daily fruits and vegetables: Yes - Reviewed recommendation for goal of 3-5 servings or fruit and vegetables daily   Iron source in diet: yes   Milk: 2% milk           Bottle   Juice: Yes - counseled on limiting to less than 6-8 oz per day    Dental home: No - Reviewed establishing dental care at this age, recommendation for a fluoride treatment, and regularly brushing teeth with a smear or rice-grain amount of fluoride containing toothpaste  Brushing teeth twice daily: Yes  Source of fluoride: Yes    Toilet trained: Not yet  Elimination: No voiding concerns, regular soft bowel movements   Sleep: Sleeping through the night: Yes, Other sleep concerns: none    Behavior: No concerns   Physical activity (playtime, greater than 60 minutes per day): Yes  Screen time: 60 minutes/day - Counseling provided on limiting to goal of <1 hour per day    Development:    Concerns about development: none  ASQ performed: Yes   Communication: Above cut-off   Gross Motor: Borderline   Fine Motor: Above cut-off   Problem Solving: Borderline   Personal-Social: Above cut-off  Plan: No intervention (screening reassuring); encouraged continuing frequent interactive play, reading, and singing; repeat screen at next well visit   ROS:   Review of Systems   Constitutional: Negative for appetite change, fatigue and fever. HENT: Negative for congestion, drooling and ear discharge. Respiratory: Negative for apnea, cough and wheezing. Gastrointestinal: Negative for abdominal pain, constipation, diarrhea and vomiting. Genitourinary: Negative for hematuria. Skin: Positive for color change and rash. Neurological: Negative for facial asymmetry and weakness. PHYSICAL EXAM:   VITAL SIGNS:Height 34.65\" (88 cm), weight 27 lb 5 oz (12.4 kg), head circumference 50.2 cm (19.75\"). Body mass index is 16 kg/m².  37 %ile (Z= -0.34) based on CDC (Boys, 2-20 Years) weight-for-age data using vitals from 9/23/2021. 56 %ile (Z= 0.14) based on CDC (Boys, 2-20 Years) Stature-for-age data based on Stature recorded on 9/23/2021. 34 %ile (Z= -0.40) based on CDC (Boys, 2-20 Years) BMI-for-age based on BMI available as of 9/23/2021. No blood pressure reading on file for this encounter. Physical Exam  Constitutional:       General: He is active. He is not in acute distress. Appearance: Normal appearance. He is well-developed and normal weight. He is not toxic-appearing. HENT:      Head: Normocephalic and atraumatic. Right Ear: Tympanic membrane, ear canal and external ear normal. There is no impacted cerumen. Tympanic membrane is not erythematous or bulging. Left Ear: Tympanic membrane, ear canal and external ear normal. There is no impacted cerumen. Tympanic membrane is not erythematous or bulging. Nose: Nose normal. No congestion. Cardiovascular:      Rate and Rhythm: Normal rate and regular rhythm. Pulses: Normal pulses. Heart sounds: No murmur heard. No gallop. Pulmonary:      Effort: Pulmonary effort is normal. No respiratory distress. Skin:     General: Skin is warm. Findings: Rash present. Comments: eczematous rash on right face and upper back   Neurological:      Mental Status: He is alert. No results found for this visit on 09/23/21. No exam data present    Immunization History   Administered Date(s) Administered    DTaP (Infanrix) 09/23/2021    DTaP/Hib/IPV (Pentacel) 2019, 03/10/2020, 08/31/2020    Hepatitis A Ped/Adol (Havrix, Vaqta) 08/31/2020, 09/23/2021    Hepatitis B Ped/Adol (Engerix-B, Recombivax HB) 2019, 2019, 03/10/2020    MMR 08/31/2020    Pneumococcal Conjugate 13-valent (Vicki Ally) 2019, 03/10/2020, 09/23/2021    Varicella (Varivax) 08/31/2020        ASSESSMENT/PLAN:  1. 1 year well visit - following along nicely on growth curves and developing well. Physical examination reassuring.  PMHx history significant for eczema. No other concerns today. Child at risk for  hearing loss. Anticipatory guidance provided on:    Social determinants of health including living situation, food security, and parent well-being   Toilet training   Development, promotion of physical activity and safe play, limits on media use  [employer] of reading  [employer], water, and gun safety  Bright Futures (AAP) handout provided at conclusion of visit   Parents to call with any questions or concerns. 2. Immunizations: Up to date    HepA, Dtap and Prevnar given today    3. Autism screening ([facility]):WNL, reassuring for age      3. Due for lead and anemia screenings: Lead and hgb ordered today    5. Eczema  Recommended emollient 3-5 times daily   Hydrocortisone cream given today, use BID for up to 14 days for flares, prolonged and excessive use can be associated with skin atrophy, hypopigmentation, discussed, call if needing beyond this time  Discussed eczema care, written instructions provided  Follow-up as needed  Call if itching persists or worsening, can trial Atarax or Benadryl if needed    7. Risk for  hearing loss: Comprehensive testing recommended, discussed, order and referral placed    Follow-up visit in 28 months for 1 year Well Child Visit.

## 2023-08-19 NOTE — ED PROVIDER NOTE - CLINICAL SUMMARY MEDICAL DECISION MAKING FREE TEXT BOX
38 yr old female with hx of gastric sleeve, cholecystectomy and hypothyroidism presents to ed c/o upper abd pain since last evening with nv NBNB and diarrhea. no fever, + chills, no alcohol use, no travel. unable to keep things down. no dysuria, no trauma.    likely gastroenteritis vs gastritis. no clinical sign of DM, no gyn path, pt neurologically intact- not consistent with neurological insult. no uti sx. labs, fluids, meds, po challenge, if work up or sx concerning then ct.

## 2023-08-19 NOTE — ED ADULT NURSE NOTE - NSFALLUNIVINTERV_ED_ALL_ED
Bed/Stretcher in lowest position, wheels locked, appropriate side rails in place/Call bell, personal items and telephone in reach/Instruct patient to call for assistance before getting out of bed/chair/stretcher/Non-slip footwear applied when patient is off stretcher/[place] to call system/Physically safe environment - no spills, clutter or unnecessary equipment/Purposeful proactive rounding/Room/bathroom lighting operational, light cord in reach

## 2023-08-19 NOTE — ED ADULT TRIAGE NOTE - PATIENT'S PREFERRED PRONOUN
"  Cancer Genetics  Hereditary/High Risk Clinic  Department of Hematology and Oncology  [facility]        Date of Service:  2023  Provider:  Nunu Stovall MS, [facility]    Patient Information  Name:  Amor Alcazar  :  1939  MRN:  [id number]        Referring Provider: Beka Alvarez MD    Televisit Information  The patient location is: [place], [place].  The chief complaint leading to consultation is: as below.  Visit type: audiovisual.  Face-to-face time with patient: approximately 40 minutes.  Approximately 85 minutes in total were spent on this encounter, which includes face-to-face time and non-face-to-face time preparing to see the patient (e.g., review of tests), obtaining and/or reviewing separately obtained history, documenting clinical information in the electronic or other health record, independently interpreting results (not separately reported) and communicating results to the patient/family/caregiver, or care coordination (not separately reported).  Each patient to whom he or she provides medical services by telemedicine is:  (1) informed of the relationship between the physician and patient and the respective role of any other health care provider with respect to management of the patient; and (2) notified that he or she may decline to receive medical services by telemedicine and may withdraw from such care at any time.      SUBJECTIVE:      Chief Complaint: Post-test Genetic Counseling    History of Present Illness (HPI):  Amor Alcazar ("Amor"),84  y.o., assigned male sex at birth is established with the [facility] Department of Hematology and Oncology but new to me.  He was referred by Medical Oncology for genetic cancer risk assessment given his personal history of a CHEK2 mutation. At ~age 75, he was diagnosed with prostate cancer (Havana 9 (4+5)). He had a rising PSA and underwent PSMA PET/CT 22 showing disease in the prostate and mediastinal LN's.  He has been " receiving Lupron 45mg 6 months dosing with Dr Bellamy with last treatment on 4/20/22.  He has previously been on Casodex and Apalutamide with progression.  He was started on Mitoxantrone and prednisone 7/01/22. He also has a personal history of basal cell carcinoma s/p excision. He underwent genetic testing, which revealed that he carries a pathogenic CHEK2 gene mutation. We met today to discuss these test results.    Focused Medical History:   Germline cancer-genetic testing:  Yes - [facility] Prostate Cancer Panel (2023)  CHEK2 [id number] (p.[id number]) - PATHOGENIC, possibly mosaic  Cancer:  Yes  Prostate cancer (2014)  Basal cell carcinoma   Colonoscopy: Yes  Most recent colonoscopy: 12/03/2013  Colon polyp:  Yes - 1 polyp  Other benign tumor:  No  Pancreatitis:  No  Pancreatic cyst:  No     Focused Surgical History  N/A    Ancestry:  Ashkenazi Buddhism ancestry:  No  Paternal: English  Maternal: English    Focused Family History:  Consanguinity in ancestors:  No  Germline cancer-genetic testing in blood relatives:  No  Cancer in family:  Yes; there are no known blood relatives affected with cancer other than those mentioned in the pedigree below    Family Cancer Pedigree:           Genetic Testing Report:        Review of Systems:  See HPI.      SUBJECTIVE:   Records Reviewed  Medical History  Problem List  Any pertinent, available Procedures and Pathology reports in both [facility] and [facility]    COUNSELING   Genetic Testing  Due to his personal history history of cancer, he underwent genetic testing in July 2023. The [facility] Prostate Cancer Panel (w/ add-on preliminary evidence genes for prostate cancer) was ordered by Beka Alvarez MD, which investigated the following 19 genes: 12-gene [facility] Prostate Cancer Gene Panel (MARIUM, BRCA1, BRCA2, CHEK2, EPCAM, HOXB13, MLH1, MSH2, MSH6, NBN, PMS2, TP53) and 7 preliminary-evidence genes for prostate cancer (ATR, BRIP1, FANCA, GEN1, PALB2, RAD51C,  "RAD51D).     Results are POSITIVE for a heterozygous germline pathogenic mutation in the CHEK2 gene [id number] (p.[id number]*18) - possibly mosaic. This result may explain his history of prostate cancer. No pathogenic mutations were identified in any of the other 18 genes investigated.     Further explanation of results: In regards to the "possibly mosaic results", mosaicism means a mutation or variant may be present in some cells in the body but not in others. However, this result should not be definitive proof of mosaicism. It is not always possible to differentiate between constitutional mosaicism, somatic mosaicism, or other bias. Interpretation of this result is dependent on the clinical history, previous result, and specimen testing. The degree of mosaicism in Amor's germline is unknown. Testing additional family members or DNA isolated cultured skin fibroblasts or other tissues may be indicated.    CHEK2 Risks & Management  We discussed that pathogenic mutations in the CHEK2 gene increase the risk for certain types of cancer. We reviewed the specific cancer risks and the National Comprehensive Cancer Network (NCCN) guidelines for individuals with a pathogenic CHEK2 mutation, as outlined below.      Prostate cancer risk: Men with a CHEK2 mutation have an increased risk for prostate cancer. Men may consider annual prostate cancer screening beginning at age 40. Prostate cancer screening involves both a digital rectal exam and PSA blood testing.    Breast cancer risk: Women with a CHEK2 mutation have an increased risk for breast cancer, which warrants additional breast screening. The lifetime risk for female breast cancer is estimated between 23-48% (which is 2-4x greater than the general population risk). There is also an increased risk for developing a second breast cancer (new primary); this lifetime risk for a second breast cancer is up to 29%. Per NCCN guidelines, women with a CHEK2 mutation should, " starting at age 40, have an annual mammogram with tomosynthesis and consider breast MRI with contrast. Typically these exams are spaced apart by 6 months. If there is a family history of breast cancer before age 40, then breast screening should begin 5-10 years earlier than the youngest diagnosis in the family (but not later than age 40). Regarding risk-reducing mastectomy, evidence is insufficient for NCCN guidelines to recommend it, but women may consider it based on their family history and personal decision.     Colorectal cancer risk: Both men and women with a CHEK2 mutation have an elevated lifetime risk for colorectal cancer. Due to this increased risk, NCCN guidelines recommend that patients have a colonoscopy every 5 years beginning at age 40 (or 10 years prior to the first colorectal cancer diagnosis in the family, but not later than age 40).    Other cancer risk: There may be an increased risk for other cancer types associated with this CHEK2 mutation, but evidence is limited. Currently, there are no specific screening guidelines for other cancer types associated with this CHEK2 mutation.    Implications for Relatives  We discussed that other relatives may also have this CHEK2 gene mutation. He likely inherited this CHEK2 mutation from one of his parents (as opposed to being de gian). We discussed that there is a 50% chance for each of his children to have inherited this same mutation. Typically, it is not recommended for children to have genetic testing for some cancer gene mutations. Genetic testing of relatives is strongly recommended to clarify who else in the family (e.g. parents, children, siblings, aunts, uncles, cousins, etc.) has these cancer risks that warrant increased cancer screening. The ultimate goal of genetic testing of family members is prevention of future cancers or at least earlier detection with better prognosis. Free genetic testing is available to Amor's relatives until October  25, 2023.     Preimplantation genetic testing (PGT) with IVF is available for individuals of reproductive age who carry the CHEK2 mutation and want to prevent passing on the mutation to future children.     Assessment/Plan  Continue with treatment and follow-ups for prostate cancer.   Share genetic test results with children and other relatives.     Questions were encouraged and answered to the patient's satisfaction, and she verbalized understanding of information and agreement with the plan.         SignedNunu MS, [facility]  Certified Genetic Counselor, Hereditary and High-Risk Clinic  Hematology/Oncology, [facility]    08/03/2023       Her/She

## 2023-08-19 NOTE — ED PROVIDER NOTE - OBJECTIVE STATEMENT
38 yr old female with hx of gastric sleeve, cholecystectomy and hypothyroidism presents to ed c/o upper abd pain since last evening with nv NBNB and diarrhea. no fever, + chills, no alcohol use, no travel. unable to keep things down. no dysuria, no trauma.

## 2023-08-19 NOTE — ED PROVIDER NOTE - PATIENT PORTAL LINK FT
You can access the FollowMyHealth Patient Portal offered by [facility] by registering at the following website: [email or web address]. By joining [employer]’s FollowMyHealth portal, you will also be able to view your health information using other applications (apps) compatible with our system.

## 2024-05-24 NOTE — ED PROVIDER NOTE - CROS ED ROS STATEMENT
Patient Education    BRIGHT FUTURES HANDOUT- PATIENT  15 THROUGH 17 YEAR VISITS  Here are some suggestions from [facility] experts that may be of value to your family.     HOW YOU ARE DOING  Enjoy spending time with your family. Look for ways you can help at home.  Find ways to work with your family to solve problems. Follow your family s rules.  Form healthy friendships and find fun, safe things to do with friends.  Set high goals for yourself in school and activities and for your future.  Try to be responsible for your schoolwork and for getting to school or work on time.  Find ways to deal with stress. Talk with your parents or other trusted adults if you need help.  Always talk through problems and never use violence.  If you get angry with someone, walk away if you can.  Call for help if you are in a situation that feels dangerous.  Healthy dating relationships are built on respect, concern, and doing things both of you like to do.  When you re dating or in a sexual situation,  No  means NO. NO is OK.  Don t smoke, vape, use drugs, or drink alcohol. Talk with us if you are worried about alcohol or drug use in your family.    YOUR DAILY LIFE  Visit the dentist at least twice a year.  Brush your teeth at least twice a day and floss once a day.  Be a healthy eater. It helps you do well in school and sports.  Have vegetables, fruits, lean protein, and whole grains at meals and snacks.  Limit fatty, sugary, and salty foods that are low in nutrients, such as candy, chips, and ice cream.  Eat when you re hungry. Stop when you feel satisfied.  Eat with your family often.  Eat breakfast.  Drink plenty of water. Choose water instead of soda or sports drinks.  Make sure to get enough calcium every day.  Have 3 or more servings of low-fat (1%) or fat-free milk and other low-fat dairy products, such as yogurt and cheese.  Aim for at least 1 hour of physical activity every day.  Wear your mouth guard when playing  sports.  Get enough sleep.    YOUR FEELINGS  Be proud of yourself when you do something good.  Figure out healthy ways to deal with stress.  Develop ways to solve problems and make good decisions.  It s OK to feel up sometimes and down others, but if you feel sad most of the time, let us know so we can help you.  It s important for you to have accurate information about sexuality, your physical development, and your sexual feelings toward the opposite or same sex. Please consider asking us if you have any questions.    HEALTHY BEHAVIOR CHOICES  Choose friends who support your decision to not use tobacco, alcohol, or drugs. Support friends who choose not to use.  Avoid situations with alcohol or drugs.  Don t share your prescription medicines. Don t use other people s medicines.  Not having sex is the safest way to avoid pregnancy and sexually transmitted infections (STIs).  Plan how to avoid sex and risky situations.  If you re sexually active, protect against pregnancy and STIs by correctly and consistently using birth control along with a condom.  Protect your hearing at work, home, and concerts. Keep your earbud volume down.    STAYING SAFE  Always be a safe and cautious .  Insist that everyone use a lap and shoulder seat belt.  Limit the number of friends in the car and avoid driving at night.  Avoid distractions. Never text or talk on the phone while you drive.  Do not ride in a vehicle with someone who has been using drugs or alcohol.  If you feel unsafe driving or riding with someone, call someone you trust to drive you.  Wear helmets and protective gear while playing sports. Wear a helmet when riding a bike, a motorcycle, or an ATV or when skiing or skateboarding. Wear a life jacket when you do water sports.  Always use sunscreen and a hat when you re outside.  Fighting and carrying weapons can be dangerous. Talk with your parents, teachers, or doctor about how to avoid these  situations.        Consistent with Bright Futures: Guidelines for Health Supervision of Infants, Children, and Adolescents, 4th Edition  For more information, go to https://brightfutures.aap.org.             Patient Education    BRIGHT FUTURES HANDOUT- PARENT  15 THROUGH 17 YEAR VISITS  Here are some suggestions from [employer] experts that may be of value to your family.     HOW YOUR FAMILY IS DOING  Set aside time to be with your teen and really listen to her hopes and concerns.  Support your teen in finding activities that interest him. Encourage your teen to help others in the community.  Help your teen find and be a part of positive after-school activities and sports.  Support your teen as she figures out ways to deal with stress, solve problems, and make decisions.  Help your teen deal with conflict.  If you are worried about your living or food situation, talk with us. Community agencies and programs such as SNAP can also provide information.    YOUR GROWING AND CHANGING TEEN  Make sure your teen visits the dentist at least twice a year.  Give your teen a fluoride supplement if the dentist recommends it.  Support your teen s healthy body weight and help him be a healthy eater.  Provide healthy foods.  Eat together as a family.  Be a role model.  Help your teen get enough calcium with low-fat or fat-free milk, low-fat yogurt, and cheese.  Encourage at least 1 hour of physical activity a day.  Praise your teen when she does something well, not just when she looks good.    YOUR TEEN S FEELINGS  If you are concerned that your teen is sad, depressed, nervous, irritable, hopeless, or angry, let us know.  If you have questions about your teen s sexual development, you can always talk with us.    HEALTHY BEHAVIOR CHOICES  Know your teen s friends and their parents. Be aware of where your teen is and what he is doing at all times.  Talk with your teen about your values and your expectations on drinking, drug use,  tobacco use, driving, and sex.  Praise your teen for healthy decisions about sex, tobacco, alcohol, and other drugs.  Be a role model.  Know your teen s friends and their activities together.  Lock your liquor in a cabinet.  Store prescription medications in a locked cabinet.  Be there for your teen when she needs support or help in making healthy decisions about her behavior.    SAFETY  Encourage safe and responsible driving habits.  Lap and shoulder seat belts should be used by everyone.  Limit the number of friends in the car and ask your teen to avoid driving at night.  Discuss with your teen how to avoid risky situations, who to call if your teen feels unsafe, and what you expect of your teen as a .  Do not tolerate drinking and driving.  If it is necessary to keep a gun in your home, store it unloaded and locked with the ammunition locked separately from the gun.      Consistent with Bright Futures: Guidelines for Health Supervision of Infants, Children, and Adolescents, 4th Edition  For more information, go to https://brightfutures.aap.org.              all other ROS negative except as per HPI

## 2024-06-05 NOTE — PACU DISCHARGE NOTE - PAIN:
Quality 226: Preventive Care And Screening: Tobacco Use: Screening And Cessation Intervention: Patient screened for tobacco use and is an ex/non-smoker
Quality 130: Documentation Of Current Medications In The Medical Record: Current Medications Documented
Quality 431: Preventive Care And Screening: Unhealthy Alcohol Use - Screening: Patient not identified as an unhealthy alcohol user when screened for unhealthy alcohol use using a systematic screening method
Controlled with current regime
Detail Level: Detailed

## 2024-09-24 ENCOUNTER — NON-APPOINTMENT (OUTPATIENT)
Age: 39
End: 2024-09-24

## 2024-09-25 ENCOUNTER — APPOINTMENT (OUTPATIENT)
Dept: OBGYN | Facility: CLINIC | Age: 39
End: 2024-09-25

## 2024-09-25 ENCOUNTER — OUTPATIENT (OUTPATIENT)
Dept: OUTPATIENT SERVICES | Facility: HOSPITAL | Age: 39
LOS: 1 days | End: 2024-09-25
Payer: COMMERCIAL

## 2024-09-25 VITALS
RESPIRATION RATE: 18 BRPM | WEIGHT: 175 LBS | HEART RATE: 84 BPM | OXYGEN SATURATION: 99 % | TEMPERATURE: 97.2 F | HEIGHT: 64 IN | BODY MASS INDEX: 29.88 KG/M2 | SYSTOLIC BLOOD PRESSURE: 135 MMHG | DIASTOLIC BLOOD PRESSURE: 83 MMHG

## 2024-09-25 DIAGNOSIS — Z98.89 OTHER SPECIFIED POSTPROCEDURAL STATES: Chronic | ICD-10-CM

## 2024-09-25 DIAGNOSIS — N92.3 OVULATION BLEEDING: ICD-10-CM

## 2024-09-25 DIAGNOSIS — E07.89 OTHER SPECIFIED DISORDERS OF THYROID: Chronic | ICD-10-CM

## 2024-09-25 DIAGNOSIS — Z90.49 ACQUIRED ABSENCE OF OTHER SPECIFIED PARTS OF DIGESTIVE TRACT: Chronic | ICD-10-CM

## 2024-09-25 DIAGNOSIS — Z00.00 ENCOUNTER FOR GENERAL ADULT MEDICAL EXAMINATION WITHOUT ABNORMAL FINDINGS: ICD-10-CM

## 2024-09-25 PROCEDURE — 99204 OFFICE O/P NEW MOD 45 MIN: CPT

## 2024-09-25 PROCEDURE — G0463: CPT

## 2024-09-25 RX ORDER — DOXYCYCLINE HYCLATE 100 MG/1
100 CAPSULE ORAL
Qty: 28 | Refills: 0 | Status: ACTIVE | COMMUNITY
Start: 2024-09-25 | End: 1900-01-01

## 2024-09-25 RX ORDER — METRONIDAZOLE 500 MG/1
500 TABLET ORAL TWICE DAILY
Qty: 28 | Refills: 0 | Status: ACTIVE | COMMUNITY
Start: 2024-09-25 | End: 1900-01-01

## 2024-09-25 NOTE — PHYSICAL EXAM
[Appropriately responsive] : appropriately responsive [Alert] : alert [No Acute Distress] : no acute distress [No Lymphadenopathy] : no lymphadenopathy [Regular Rate Rhythm] : regular rate rhythm [No Murmurs] : no murmurs [Clear to Auscultation B/L] : clear to auscultation bilaterally [Soft] : soft [Non-tender] : non-tender [Non-distended] : non-distended [No HSM] : No HSM [No Lesions] : no lesions [No Mass] : no mass [Oriented x3] : oriented x3 [Labia Majora] : normal [Labia Minora] : normal [Motion Tenderness] : motion tenderness [Normal] : normal [Uterine Adnexae] : normal

## 2024-09-25 NOTE — HISTORY OF PRESENT ILLNESS
[FreeTextEntry1] : new patient  here for intermenstrual bleeding  hx myomectomy recent MRI suggest adenomyosis  has pelvic pain and dyspareunia  papa 2024 gregg  hx thiyroid CA

## 2024-09-26 LAB
ESTRADIOL SERPL-MCNC: 72 PG/ML
FSH SERPL-MCNC: 16.9 IU/L
HCG SERPL-MCNC: <1 MIU/ML
LH SERPL-ACNC: 18.2 IU/L
PROLACTIN SERPL-MCNC: 8.7 NG/ML
TSH SERPL-ACNC: 0.11 UIU/ML

## 2024-09-30 DIAGNOSIS — N92.3 OVULATION BLEEDING: ICD-10-CM

## 2024-09-30 DIAGNOSIS — N73.0 ACUTE PARAMETRITIS AND PELVIC CELLULITIS: ICD-10-CM

## 2024-10-02 ENCOUNTER — OUTPATIENT (OUTPATIENT)
Dept: OUTPATIENT SERVICES | Facility: HOSPITAL | Age: 39
LOS: 1 days | End: 2024-10-02
Payer: COMMERCIAL

## 2024-10-02 ENCOUNTER — APPOINTMENT (OUTPATIENT)
Dept: OBGYN | Facility: CLINIC | Age: 39
End: 2024-10-02
Payer: COMMERCIAL

## 2024-10-02 VITALS
WEIGHT: 169 LBS | OXYGEN SATURATION: 98 % | SYSTOLIC BLOOD PRESSURE: 125 MMHG | DIASTOLIC BLOOD PRESSURE: 79 MMHG | BODY MASS INDEX: 28.85 KG/M2 | HEIGHT: 64 IN | TEMPERATURE: 98.8 F | HEART RATE: 81 BPM | RESPIRATION RATE: 18 BRPM

## 2024-10-02 DIAGNOSIS — Z98.89 OTHER SPECIFIED POSTPROCEDURAL STATES: Chronic | ICD-10-CM

## 2024-10-02 DIAGNOSIS — N73.0 ACUTE PARAMETRITIS AND PELVIC CELLULITIS: ICD-10-CM

## 2024-10-02 DIAGNOSIS — Z00.00 ENCOUNTER FOR GENERAL ADULT MEDICAL EXAMINATION WITHOUT ABNORMAL FINDINGS: ICD-10-CM

## 2024-10-02 DIAGNOSIS — E07.89 OTHER SPECIFIED DISORDERS OF THYROID: Chronic | ICD-10-CM

## 2024-10-02 PROCEDURE — G0463: CPT

## 2024-10-02 PROCEDURE — 99213 OFFICE O/P EST LOW 20 MIN: CPT

## 2024-10-08 DIAGNOSIS — N73.0 ACUTE PARAMETRITIS AND PELVIC CELLULITIS: ICD-10-CM

## 2024-12-11 NOTE — ED ADULT NURSE NOTE - NSSUHOSCREENINGYN_ED_ALL_ED
Pre-op Dx:Menorrhagia, iron deficiency anemia  Post-op Dx:same  Surgeon:   Procedure: Hysteroscopy D&C, Novasure ablation  Complications: none  Findings: uterus width 4.7 cm, length 5.5. cm, no endometrial lesions  Procedure note: Pt was taken to the O.R. Where anesthesia was obtained without difficulty. She was then prepped and draped in normal sterile fashion in dorsal lithotomy position. Weighted speculum was placed in patients vagina and single tooth tenaculum applied to anterior lip of the cervix. Cervix was then gently dilated, uterus sounded to be 10 cm cm, and hysteroscope introduced with findings above.Sharp curettage was performed, Novasure device was then placed intra uterine, cavity assessment completed and passed, ablation completed in 84 seconds ,all instruments removed from patients vagina, good hemostasis noted.  
Yes - the patient is able to be screened

## 2025-02-14 ENCOUNTER — LABORATORY RESULT (OUTPATIENT)
Age: 40
End: 2025-02-14

## 2025-02-14 ENCOUNTER — APPOINTMENT (OUTPATIENT)
Dept: INTERNAL MEDICINE | Facility: CLINIC | Age: 40
End: 2025-02-14
Payer: COMMERCIAL

## 2025-02-14 ENCOUNTER — OUTPATIENT (OUTPATIENT)
Dept: OUTPATIENT SERVICES | Facility: HOSPITAL | Age: 40
LOS: 1 days | End: 2025-02-14
Payer: COMMERCIAL

## 2025-02-14 VITALS
DIASTOLIC BLOOD PRESSURE: 76 MMHG | TEMPERATURE: 98.4 F | SYSTOLIC BLOOD PRESSURE: 117 MMHG | HEART RATE: 94 BPM | WEIGHT: 181 LBS | OXYGEN SATURATION: 99 % | HEIGHT: 64 IN | BODY MASS INDEX: 30.9 KG/M2

## 2025-02-14 DIAGNOSIS — Z91.013 ALLERGY TO SEAFOOD: ICD-10-CM

## 2025-02-14 DIAGNOSIS — Z34.00 ENCOUNTER FOR SUPERVISION OF NORMAL FIRST PREGNANCY, UNSPECIFIED TRIMESTER: ICD-10-CM

## 2025-02-14 DIAGNOSIS — Z00.00 ENCOUNTER FOR GENERAL ADULT MEDICAL EXAMINATION W/OUT ABNORMAL FINDINGS: ICD-10-CM

## 2025-02-14 DIAGNOSIS — Z01.811 ENCOUNTER FOR PREPROCEDURAL RESPIRATORY EXAMINATION: ICD-10-CM

## 2025-02-14 DIAGNOSIS — E03.9 HYPOTHYROIDISM, UNSPECIFIED: ICD-10-CM

## 2025-02-14 DIAGNOSIS — E07.89 OTHER SPECIFIED DISORDERS OF THYROID: Chronic | ICD-10-CM

## 2025-02-14 DIAGNOSIS — M25.50 PAIN IN UNSPECIFIED JOINT: ICD-10-CM

## 2025-02-14 DIAGNOSIS — Z86.39 PERSONAL HISTORY OF OTHER ENDOCRINE, NUTRITIONAL AND METABOLIC DISEASE: ICD-10-CM

## 2025-02-14 DIAGNOSIS — Z87.09 PERSONAL HISTORY OF OTHER DISEASES OF THE RESPIRATORY SYSTEM: ICD-10-CM

## 2025-02-14 DIAGNOSIS — Z87.42 PERSONAL HISTORY OF OTHER DISEASES OF THE FEMALE GENITAL TRACT: ICD-10-CM

## 2025-02-14 DIAGNOSIS — N60.19 DIFFUSE CYSTIC MASTOPATHY OF UNSPECIFIED BREAST: ICD-10-CM

## 2025-02-14 DIAGNOSIS — Z98.89 OTHER SPECIFIED POSTPROCEDURAL STATES: Chronic | ICD-10-CM

## 2025-02-14 DIAGNOSIS — Z90.49 ACQUIRED ABSENCE OF OTHER SPECIFIED PARTS OF DIGESTIVE TRACT: Chronic | ICD-10-CM

## 2025-02-14 DIAGNOSIS — Z80.3 FAMILY HISTORY OF MALIGNANT NEOPLASM OF BREAST: ICD-10-CM

## 2025-02-14 DIAGNOSIS — Z82.69 FAMILY HISTORY OF OTHER DISEASES OF THE MUSCULOSKELETAL SYSTEM AND CONNECTIVE TISSUE: ICD-10-CM

## 2025-02-14 DIAGNOSIS — E78.5 HYPERLIPIDEMIA, UNSPECIFIED: ICD-10-CM

## 2025-02-14 DIAGNOSIS — R07.89 OTHER CHEST PAIN: ICD-10-CM

## 2025-02-14 DIAGNOSIS — E66.9 OBESITY, UNSPECIFIED: ICD-10-CM

## 2025-02-14 DIAGNOSIS — E11.9 TYPE 2 DIABETES MELLITUS W/OUT COMPLICATIONS: ICD-10-CM

## 2025-02-14 LAB
25(OH)D3 SERPL-MCNC: 47.9 NG/ML
ALBUMIN SERPL ELPH-MCNC: 4.2 G/DL
ALP BLD-CCNC: 73 U/L
ALT SERPL-CCNC: 30 U/L
ANION GAP SERPL CALC-SCNC: 10 MMOL/L
AST SERPL-CCNC: 21 U/L
BILIRUB SERPL-MCNC: 0.5 MG/DL
BUN SERPL-MCNC: 15 MG/DL
CALCIUM SERPL-MCNC: 9.3 MG/DL
CHLORIDE SERPL-SCNC: 105 MMOL/L
CO2 SERPL-SCNC: 28 MMOL/L
CREAT SERPL-MCNC: 0.94 MG/DL
EGFR: 78 ML/MIN/1.73M2
GLUCOSE SERPL-MCNC: 91 MG/DL
POTASSIUM SERPL-SCNC: 4.2 MMOL/L
PROT SERPL-MCNC: 6.7 G/DL
SODIUM SERPL-SCNC: 143 MMOL/L

## 2025-02-14 PROCEDURE — 86803 HEPATITIS C AB TEST: CPT

## 2025-02-14 PROCEDURE — 99386 PREV VISIT NEW AGE 40-64: CPT

## 2025-02-14 PROCEDURE — 84439 ASSAY OF FREE THYROXINE: CPT

## 2025-02-14 PROCEDURE — 83036 HEMOGLOBIN GLYCOSYLATED A1C: CPT

## 2025-02-14 PROCEDURE — 85652 RBC SED RATE AUTOMATED: CPT

## 2025-02-14 PROCEDURE — 80061 LIPID PANEL: CPT

## 2025-02-14 PROCEDURE — 86706 HEP B SURFACE ANTIBODY: CPT

## 2025-02-14 PROCEDURE — 86140 C-REACTIVE PROTEIN: CPT

## 2025-02-14 PROCEDURE — 87389 HIV-1 AG W/HIV-1&-2 AB AG IA: CPT

## 2025-02-14 PROCEDURE — 86038 ANTINUCLEAR ANTIBODIES: CPT

## 2025-02-14 PROCEDURE — 82306 VITAMIN D 25 HYDROXY: CPT

## 2025-02-14 PROCEDURE — 85025 COMPLETE CBC W/AUTO DIFF WBC: CPT

## 2025-02-14 PROCEDURE — 84443 ASSAY THYROID STIM HORMONE: CPT

## 2025-02-14 PROCEDURE — 99204 OFFICE O/P NEW MOD 45 MIN: CPT | Mod: 25

## 2025-02-14 PROCEDURE — 81001 URINALYSIS AUTO W/SCOPE: CPT

## 2025-02-14 PROCEDURE — 80053 COMPREHEN METABOLIC PANEL: CPT

## 2025-02-14 PROCEDURE — G0463: CPT

## 2025-02-15 LAB
APPEARANCE: ABNORMAL
BASOPHILS # BLD AUTO: 0.04 K/UL
BASOPHILS NFR BLD AUTO: 0.7 %
BILIRUBIN URINE: NEGATIVE
BLOOD URINE: NEGATIVE
COLOR: YELLOW
CRP SERPL-MCNC: <3 MG/L
EOSINOPHIL # BLD AUTO: 0.2 K/UL
EOSINOPHIL NFR BLD AUTO: 3.6 %
ERYTHROCYTE [SEDIMENTATION RATE] IN BLOOD BY WESTERGREN METHOD: 8 MM/HR
ESTIMATED AVERAGE GLUCOSE: 105 MG/DL
GLUCOSE QUALITATIVE U: NEGATIVE MG/DL
HBA1C MFR BLD HPLC: 5.3 %
HCT VFR BLD CALC: 35.5 %
HGB BLD-MCNC: 11.7 G/DL
IMM GRANULOCYTES NFR BLD AUTO: 0.2 %
KETONES URINE: NEGATIVE MG/DL
LEUKOCYTE ESTERASE URINE: NEGATIVE
LYMPHOCYTES # BLD AUTO: 2.23 K/UL
LYMPHOCYTES NFR BLD AUTO: 40 %
MAN DIFF?: NORMAL
MCHC RBC-ENTMCNC: 29.8 PG
MCHC RBC-ENTMCNC: 33 G/DL
MCV RBC AUTO: 90.6 FL
MONOCYTES # BLD AUTO: 0.61 K/UL
MONOCYTES NFR BLD AUTO: 11 %
NEUTROPHILS # BLD AUTO: 2.48 K/UL
NEUTROPHILS NFR BLD AUTO: 44.5 %
NITRITE URINE: NEGATIVE
PH URINE: 7
PLATELET # BLD AUTO: 243 K/UL
PROTEIN URINE: NEGATIVE MG/DL
RBC # BLD: 3.92 M/UL
RBC # FLD: 14.1 %
SPECIFIC GRAVITY URINE: 1.02
UROBILINOGEN URINE: 0.2 MG/DL
WBC # FLD AUTO: 5.57 K/UL

## 2025-02-16 LAB
HBV SURFACE AB SER QL: REACTIVE
HBV SURFACE AB SERPL IA-ACNC: 19.9 MIU/ML
HCV AB SER QL: NONREACTIVE
HCV S/CO RATIO: 0.12 S/CO
HIV1+2 AB SPEC QL IA.RAPID: NONREACTIVE

## 2025-02-18 LAB
ANA SER IF-ACNC: NEGATIVE
CHOLEST SERPL-MCNC: 182 MG/DL
HDLC SERPL-MCNC: 53 MG/DL
LDLC SERPL CALC-MCNC: 113 MG/DL
NONHDLC SERPL-MCNC: 129 MG/DL
TRIGL SERPL-MCNC: 84 MG/DL
TSH SERPL-ACNC: 0.15 UIU/ML

## 2025-02-20 ENCOUNTER — NON-APPOINTMENT (OUTPATIENT)
Age: 40
End: 2025-02-20

## 2025-02-24 DIAGNOSIS — M25.50 PAIN IN UNSPECIFIED JOINT: ICD-10-CM

## 2025-02-24 DIAGNOSIS — E11.9 TYPE 2 DIABETES MELLITUS WITHOUT COMPLICATIONS: ICD-10-CM

## 2025-02-24 DIAGNOSIS — E66.9 OBESITY, UNSPECIFIED: ICD-10-CM

## 2025-02-24 DIAGNOSIS — E03.9 HYPOTHYROIDISM, UNSPECIFIED: ICD-10-CM

## 2025-02-24 NOTE — DATA REVIEWED
Upper endoscopy and colonoscopy
[FreeTextEntry1] :  Cardiology- needs to schedule Echo and stress test, \par Pulmonary- PFT was normal, needs to  the equipment for Sleep Study at home , \par Psych.- cleared  (Dr. Monzon)\par GI cleared. \par  Patient has also seen our bariatric program coordinator and nutritionist. \par She has attended our bariatric information session.

## 2025-04-02 ENCOUNTER — APPOINTMENT (OUTPATIENT)
Dept: OBGYN | Facility: CLINIC | Age: 40
End: 2025-04-02

## 2025-05-16 ENCOUNTER — APPOINTMENT (OUTPATIENT)
Dept: INTERNAL MEDICINE | Facility: CLINIC | Age: 40
End: 2025-05-16

## 2025-07-29 ENCOUNTER — LABORATORY RESULT (OUTPATIENT)
Age: 40
End: 2025-07-29

## 2025-07-29 ENCOUNTER — APPOINTMENT (OUTPATIENT)
Dept: OBGYN | Facility: CLINIC | Age: 40
End: 2025-07-29
Payer: COMMERCIAL

## 2025-07-29 ENCOUNTER — NON-APPOINTMENT (OUTPATIENT)
Age: 40
End: 2025-07-29

## 2025-07-29 VITALS — DIASTOLIC BLOOD PRESSURE: 83 MMHG | SYSTOLIC BLOOD PRESSURE: 125 MMHG | WEIGHT: 172 LBS | BODY MASS INDEX: 29.52 KG/M2

## 2025-07-29 DIAGNOSIS — Z01.419 ENCOUNTER FOR GYNECOLOGICAL EXAMINATION (GENERAL) (ROUTINE) W/OUT ABNORMAL FINDINGS: ICD-10-CM

## 2025-07-29 DIAGNOSIS — N92.6 IRREGULAR MENSTRUATION, UNSPECIFIED: ICD-10-CM

## 2025-07-29 PROCEDURE — 99396 PREV VISIT EST AGE 40-64: CPT

## 2025-07-30 PROBLEM — Z01.419 WOMEN'S ANNUAL ROUTINE GYNECOLOGICAL EXAMINATION: Status: ACTIVE | Noted: 2025-07-29

## 2025-07-30 PROBLEM — N92.6 IRREGULAR MENSES: Status: ACTIVE | Noted: 2025-07-30

## 2025-07-30 RX ORDER — MEDROXYPROGESTERONE ACETATE 10 MG/1
10 TABLET ORAL DAILY
Qty: 10 | Refills: 4 | Status: ACTIVE | COMMUNITY
Start: 2025-07-30 | End: 1900-01-01

## 2025-08-11 ENCOUNTER — RESULT REVIEW (OUTPATIENT)
Age: 40
End: 2025-08-11

## 2025-08-11 ENCOUNTER — APPOINTMENT (OUTPATIENT)
Dept: ULTRASOUND IMAGING | Facility: CLINIC | Age: 40
End: 2025-08-11
Payer: COMMERCIAL

## 2025-08-11 PROCEDURE — 76830 TRANSVAGINAL US NON-OB: CPT

## (undated) DEVICE — SOL IRR POUR H2O 1500ML

## (undated) DEVICE — BLANKET WARMER UPPER ADULT

## (undated) DEVICE — TUBING STRYKEFLOW II SUCTION / IRRIGATOR

## (undated) DEVICE — TUBING STRYKER PNEUMOSURE HI FLOW INSUFFLATOR

## (undated) DEVICE — NDL SPINAL 22G X 3.5"

## (undated) DEVICE — POSITIONER MATTRESS HOVERMAT SPU LINK 39"

## (undated) DEVICE — TAPE UMBILICAL 1/8 X 18" STRANDS

## (undated) DEVICE — APPLICATOR VISTASEAL LAP DUAL 35CM RIGID

## (undated) DEVICE — SUT BIOSYN 4-0 18" P-12

## (undated) DEVICE — SOL INJ NS 0.9% 100ML

## (undated) DEVICE — TROCAR SURGIQUEST AIRSEAL 5MMX100MM

## (undated) DEVICE — XI STAPLER SUREFORM 60

## (undated) DEVICE — SOL IRR POUR NS 0.9% 1500ML

## (undated) DEVICE — SYR LUER LOK 20CC

## (undated) DEVICE — XI GRASPER TIP UP FENESTRATED

## (undated) DEVICE — D HELP - CLEARVIEW CLEARIFY SYSTEM

## (undated) DEVICE — ELCTR BOVIE BLADE 3/4" EXTENDED LENGTH 6"

## (undated) DEVICE — Device

## (undated) DEVICE — XI SEALER DA VINCI VESSEL

## (undated) DEVICE — WRAP COMPRESSION CALF MED

## (undated) DEVICE — SOL IRR BAG NS 0.9% 3000ML

## (undated) DEVICE — APPLICATOR DUPLOSPRAY 40CM DISP

## (undated) DEVICE — NDL HYPO SAFE 22G X 1.5"

## (undated) DEVICE — XI FORCEP CADIERE 8MM

## (undated) DEVICE — SUT POLYSORB 0 30" GU-46

## (undated) DEVICE — DRAPE HALF SHEET 40X57"

## (undated) DEVICE — GLV 6.5 PROTEXIS

## (undated) DEVICE — DRAPE LIGHT HANDLE COVER BLUE

## (undated) DEVICE — GLV 7.5 ESTEEM BLUE

## (undated) DEVICE — SUT SOFSILK 2-0 30" V-20

## (undated) DEVICE — GLV 7.5 PROTEXIS

## (undated) DEVICE — TUBE TRI-LUMEN FILT USE W AIRSEAL

## (undated) DEVICE — POSITIONER MATTRESS PAD CONVOLUTED FOAM

## (undated) DEVICE — NDL SURGI 150MM

## (undated) DEVICE — ELCTR GROUNDING PAD ADULT COVIDIEN

## (undated) DEVICE — BLADE SURGICAL #15 CARBON

## (undated) DEVICE — VISIGI 3D SLEEVE GASTRECTOMY 36FR